# Patient Record
Sex: FEMALE | Race: WHITE | NOT HISPANIC OR LATINO
[De-identification: names, ages, dates, MRNs, and addresses within clinical notes are randomized per-mention and may not be internally consistent; named-entity substitution may affect disease eponyms.]

---

## 2017-12-29 PROBLEM — Z00.00 ENCOUNTER FOR PREVENTIVE HEALTH EXAMINATION: Status: ACTIVE | Noted: 2017-12-29

## 2018-01-01 ENCOUNTER — TRANSCRIPTION ENCOUNTER (OUTPATIENT)
Age: 58
End: 2018-01-01

## 2018-01-26 ENCOUNTER — TRANSCRIPTION ENCOUNTER (OUTPATIENT)
Age: 58
End: 2018-01-26

## 2018-01-30 ENCOUNTER — APPOINTMENT (OUTPATIENT)
Dept: PULMONOLOGY | Facility: CLINIC | Age: 58
End: 2018-01-30
Payer: COMMERCIAL

## 2018-01-30 VITALS
HEART RATE: 70 BPM | DIASTOLIC BLOOD PRESSURE: 80 MMHG | WEIGHT: 112 LBS | HEIGHT: 65 IN | TEMPERATURE: 98.2 F | SYSTOLIC BLOOD PRESSURE: 120 MMHG | BODY MASS INDEX: 18.66 KG/M2 | OXYGEN SATURATION: 97 %

## 2018-01-30 PROCEDURE — 94060 EVALUATION OF WHEEZING: CPT | Mod: 59

## 2018-01-30 PROCEDURE — ZZZZZ: CPT

## 2018-01-30 PROCEDURE — 94727 GAS DIL/WSHOT DETER LNG VOL: CPT

## 2018-01-30 PROCEDURE — 99204 OFFICE O/P NEW MOD 45 MIN: CPT | Mod: 25

## 2018-01-30 PROCEDURE — 94618 PULMONARY STRESS TESTING: CPT

## 2018-01-30 PROCEDURE — 94729 DIFFUSING CAPACITY: CPT

## 2018-02-03 LAB
A1AT SERPL-MCNC: 200 MG/DL
ALBUMIN SERPL ELPH-MCNC: 4.7 G/DL
ALP BLD-CCNC: 72 U/L
ALT SERPL-CCNC: 21 U/L
ANA SER IF-ACNC: NEGATIVE
ANION GAP SERPL CALC-SCNC: 20 MMOL/L
AST SERPL-CCNC: 22 U/L
BACTERIA SPT CULT: ABNORMAL
BASOPHILS NFR BLD AUTO: 1.2 %
BILIRUB SERPL-MCNC: 0.4 MG/DL
BUN SERPL-MCNC: 9 MG/DL
CALCIUM SERPL-MCNC: 10.8 MG/DL
CCP AB SER IA-ACNC: <8 UNITS
CHLORIDE SERPL-SCNC: 105 MMOL/L
CO2 SERPL-SCNC: 22 MMOL/L
CREAT SERPL-MCNC: 0.9 MG/DL
CRP SERPL-MCNC: 0.2 MG/DL
DEPRECATED KAPPA LC FREE/LAMBDA SER: 0.28 RATIO
ENA SS-A AB SER IA-ACNC: <0.2 AL
ENA SS-B AB SER IA-ACNC: <0.2 AL
EOSINOPHIL NFR BLD AUTO: 2.6 %
ERYTHROCYTE [SEDIMENTATION RATE] IN BLOOD BY WESTERGREN METHOD: 26 MM/HR
GLUCOSE SERPL-MCNC: 95 MG/DL
HCT VFR BLD CALC: 41.4 %
HGB BLD-MCNC: 14 G/DL
IGA SER QL IEP: 170 MG/DL
IGG SER QL IEP: 1250 MG/DL
IGM SER QL IEP: 169 MG/DL
KAPPA LC CSF-MCNC: 4.83 MG/DL
KAPPA LC SERPL-MCNC: 1.37 MG/DL
LYMPHOCYTES NFR BLD AUTO: 27.3 %
MAN DIFF?: NO
MCHC RBC-ENTMCNC: 29.8 PG
MCHC RBC-ENTMCNC: 33.8 G/DL
MCV RBC AUTO: 88.1 FL
MONOCYTES NFR BLD AUTO: 6.9 %
NEUTROPHILS NFR BLD AUTO: 62 %
PLATELET # BLD AUTO: 325 K/UL
POTASSIUM SERPL-SCNC: 4.1 MMOL/L
PROT SERPL-MCNC: 7.9 G/DL
RBC # BLD: 4.7 M/UL
RBC # FLD: 12.3 %
RF+CCP IGG SER-IMP: NEGATIVE
RHEUMATOID FACT SER QL: 8 IU/ML
SODIUM SERPL-SCNC: 147 MMOL/L
WBC # FLD AUTO: 5.8 K/UL

## 2018-02-03 RX ORDER — MUCUS CLEARING DEVICE
EACH MISCELLANEOUS
Qty: 1 | Refills: 5 | Status: ACTIVE | COMMUNITY
Start: 2018-01-30 | End: 1900-01-01

## 2018-02-06 RX ORDER — SOFT LENS DISINFECTANT
SOLUTION, NON-ORAL MISCELLANEOUS
Qty: 1 | Refills: 0 | Status: ACTIVE | COMMUNITY
Start: 2018-02-06 | End: 1900-01-01

## 2018-02-18 LAB — CFTR MUT TESTED BLD/T: NORMAL

## 2018-02-22 ENCOUNTER — OTHER (OUTPATIENT)
Age: 58
End: 2018-02-22

## 2018-02-27 ENCOUNTER — RESULT REVIEW (OUTPATIENT)
Age: 58
End: 2018-02-27

## 2018-03-01 ENCOUNTER — APPOINTMENT (OUTPATIENT)
Dept: PULMONOLOGY | Facility: CLINIC | Age: 58
End: 2018-03-01

## 2018-03-02 LAB — BACTERIA SPT CULT: ABNORMAL

## 2018-03-22 ENCOUNTER — RESULT REVIEW (OUTPATIENT)
Age: 58
End: 2018-03-22

## 2018-03-22 RX ORDER — CLARITHROMYCIN 500 MG/1
500 TABLET, FILM COATED ORAL TWICE DAILY
Qty: 1 | Refills: 1 | Status: DISCONTINUED | COMMUNITY
Start: 2018-01-30 | End: 2018-03-22

## 2018-03-22 RX ORDER — ETHAMBUTOL HYDROCHLORIDE 400 MG/1
400 TABLET ORAL DAILY
Qty: 1 | Refills: 1 | Status: DISCONTINUED | COMMUNITY
Start: 2018-01-30 | End: 2018-03-22

## 2018-03-26 LAB — ACID FAST STN SPT: NORMAL

## 2018-03-29 ENCOUNTER — APPOINTMENT (OUTPATIENT)
Dept: PULMONOLOGY | Facility: CLINIC | Age: 58
End: 2018-03-29
Payer: COMMERCIAL

## 2018-03-29 VITALS
OXYGEN SATURATION: 98 % | HEIGHT: 65 IN | WEIGHT: 115 LBS | DIASTOLIC BLOOD PRESSURE: 70 MMHG | SYSTOLIC BLOOD PRESSURE: 110 MMHG | BODY MASS INDEX: 19.16 KG/M2 | TEMPERATURE: 98 F | HEART RATE: 65 BPM

## 2018-03-29 DIAGNOSIS — Z78.9 OTHER SPECIFIED HEALTH STATUS: ICD-10-CM

## 2018-03-29 DIAGNOSIS — Z83.6 FAMILY HISTORY OF OTHER DISEASES OF THE RESPIRATORY SYSTEM: ICD-10-CM

## 2018-03-29 DIAGNOSIS — Z87.39 PERSONAL HISTORY OF OTHER DISEASES OF THE MUSCULOSKELETAL SYSTEM AND CONNECTIVE TISSUE: ICD-10-CM

## 2018-03-29 PROCEDURE — 94010 BREATHING CAPACITY TEST: CPT

## 2018-03-29 PROCEDURE — 99215 OFFICE O/P EST HI 40 MIN: CPT | Mod: 25

## 2018-04-10 ENCOUNTER — RESULT REVIEW (OUTPATIENT)
Age: 58
End: 2018-04-10

## 2018-04-11 ENCOUNTER — RX RENEWAL (OUTPATIENT)
Age: 58
End: 2018-04-11

## 2018-04-17 ENCOUNTER — LABORATORY RESULT (OUTPATIENT)
Age: 58
End: 2018-04-17

## 2018-04-20 ENCOUNTER — RX RENEWAL (OUTPATIENT)
Age: 58
End: 2018-04-20

## 2018-04-24 LAB
ACID FAST STN SPT: NORMAL
BACTERIA SPT CULT: NORMAL

## 2018-04-25 ENCOUNTER — RX RENEWAL (OUTPATIENT)
Age: 58
End: 2018-04-25

## 2018-04-25 RX ORDER — CIPROFLOXACIN HYDROCHLORIDE 500 MG/1
500 TABLET, FILM COATED ORAL
Qty: 28 | Refills: 0 | Status: COMPLETED | COMMUNITY
Start: 2018-03-22 | End: 2018-04-25

## 2018-04-26 ENCOUNTER — RX RENEWAL (OUTPATIENT)
Age: 58
End: 2018-04-26

## 2018-05-15 ENCOUNTER — CLINICAL ADVICE (OUTPATIENT)
Age: 58
End: 2018-05-15

## 2018-05-20 ENCOUNTER — FORM ENCOUNTER (OUTPATIENT)
Age: 58
End: 2018-05-20

## 2018-05-20 LAB — ACID FAST STN SPT: NORMAL

## 2018-05-21 ENCOUNTER — OUTPATIENT (OUTPATIENT)
Dept: OUTPATIENT SERVICES | Facility: HOSPITAL | Age: 58
LOS: 1 days | End: 2018-05-21
Payer: COMMERCIAL

## 2018-05-21 ENCOUNTER — RESULT REVIEW (OUTPATIENT)
Age: 58
End: 2018-05-21

## 2018-05-21 DIAGNOSIS — R09.02 HYPOXEMIA: ICD-10-CM

## 2018-05-21 DIAGNOSIS — R06.02 SHORTNESS OF BREATH: ICD-10-CM

## 2018-05-21 DIAGNOSIS — J18.9 PNEUMONIA, UNSPECIFIED ORGANISM: ICD-10-CM

## 2018-05-21 PROCEDURE — 94729 DIFFUSING CAPACITY: CPT

## 2018-05-21 PROCEDURE — 71046 X-RAY EXAM CHEST 2 VIEWS: CPT

## 2018-05-21 PROCEDURE — 94060 EVALUATION OF WHEEZING: CPT

## 2018-05-21 PROCEDURE — 94618 PULMONARY STRESS TESTING: CPT

## 2018-05-21 PROCEDURE — 71046 X-RAY EXAM CHEST 2 VIEWS: CPT | Mod: 26

## 2018-05-22 PROBLEM — J18.9 PNEUMONIA: Status: ACTIVE | Noted: 2018-05-22

## 2018-05-22 LAB
ALBUMIN SERPL ELPH-MCNC: 4.5 G/DL
ALP BLD-CCNC: 60 U/L
ALT SERPL-CCNC: 16 U/L
ANION GAP SERPL CALC-SCNC: 17 MMOL/L
AST SERPL-CCNC: 20 U/L
BASOPHILS # BLD AUTO: 0.14 K/UL
BASOPHILS NFR BLD AUTO: 1.9 %
BILIRUB SERPL-MCNC: 0.4 MG/DL
BUN SERPL-MCNC: 14 MG/DL
CALCIUM SERPL-MCNC: 9.6 MG/DL
CHLORIDE SERPL-SCNC: 104 MMOL/L
CO2 SERPL-SCNC: 22 MMOL/L
CREAT SERPL-MCNC: 0.93 MG/DL
DEPRECATED D DIMER PPP IA-ACNC: <150 NG/ML DDU
EOSINOPHIL # BLD AUTO: 0.38 K/UL
EOSINOPHIL NFR BLD AUTO: 5.3 %
GLUCOSE SERPL-MCNC: 90 MG/DL
HCT VFR BLD CALC: 41.4 %
HGB BLD-MCNC: 13.8 G/DL
IMM GRANULOCYTES NFR BLD AUTO: 0.1 %
LYMPHOCYTES # BLD AUTO: 1.69 K/UL
LYMPHOCYTES NFR BLD AUTO: 23.5 %
MAN DIFF?: NORMAL
MCHC RBC-ENTMCNC: 30.2 PG
MCHC RBC-ENTMCNC: 33.3 GM/DL
MCV RBC AUTO: 90.6 FL
MONOCYTES # BLD AUTO: 0.56 K/UL
MONOCYTES NFR BLD AUTO: 7.8 %
NEUTROPHILS # BLD AUTO: 4.41 K/UL
NEUTROPHILS NFR BLD AUTO: 61.4 %
PLATELET # BLD AUTO: 309 K/UL
POTASSIUM SERPL-SCNC: 4.1 MMOL/L
PROT SERPL-MCNC: 7.6 G/DL
RBC # BLD: 4.57 M/UL
RBC # FLD: 13 %
SODIUM SERPL-SCNC: 143 MMOL/L
WBC # FLD AUTO: 7.19 K/UL

## 2018-05-24 ENCOUNTER — INPATIENT (INPATIENT)
Facility: HOSPITAL | Age: 58
LOS: 4 days | Discharge: HOME | DRG: 194 | End: 2018-05-29
Attending: INTERNAL MEDICINE | Admitting: INTERNAL MEDICINE
Payer: COMMERCIAL

## 2018-05-24 VITALS
RESPIRATION RATE: 24 BRPM | SYSTOLIC BLOOD PRESSURE: 125 MMHG | TEMPERATURE: 98 F | OXYGEN SATURATION: 96 % | DIASTOLIC BLOOD PRESSURE: 64 MMHG | HEART RATE: 101 BPM

## 2018-05-24 DIAGNOSIS — D41.9 NEOPLASM OF UNCERTAIN BEHAVIOR OF UNSPECIFIED URINARY ORGAN: ICD-10-CM

## 2018-05-24 DIAGNOSIS — A41.9 SEPSIS, UNSPECIFIED ORGANISM: ICD-10-CM

## 2018-05-24 LAB
ALBUMIN SERPL ELPH-MCNC: 3.9 G/DL — SIGNIFICANT CHANGE UP (ref 3.5–5)
ALP SERPL-CCNC: 66 U/L — SIGNIFICANT CHANGE UP (ref 40–120)
ALT FLD-CCNC: 21 U/L DA — SIGNIFICANT CHANGE UP (ref 10–60)
ANION GAP SERPL CALC-SCNC: 5 MMOL/L — SIGNIFICANT CHANGE UP (ref 5–17)
AST SERPL-CCNC: 19 U/L — SIGNIFICANT CHANGE UP (ref 10–40)
BACTERIA SPT CULT: NORMAL
BACTERIA SPT CULT: NORMAL
BASOPHILS # BLD AUTO: 0.2 K/UL — SIGNIFICANT CHANGE UP (ref 0–0.2)
BASOPHILS NFR BLD AUTO: 1.9 % — SIGNIFICANT CHANGE UP (ref 0–2)
BILIRUB SERPL-MCNC: 0.4 MG/DL — SIGNIFICANT CHANGE UP (ref 0.2–1.2)
BUN SERPL-MCNC: 10 MG/DL — SIGNIFICANT CHANGE UP (ref 7–18)
CALCIUM SERPL-MCNC: 9.6 MG/DL — SIGNIFICANT CHANGE UP (ref 8.4–10.5)
CHLORIDE SERPL-SCNC: 106 MMOL/L — SIGNIFICANT CHANGE UP (ref 96–108)
CO2 SERPL-SCNC: 31 MMOL/L — SIGNIFICANT CHANGE UP (ref 22–31)
CREAT SERPL-MCNC: 1.19 MG/DL — SIGNIFICANT CHANGE UP (ref 0.5–1.3)
EOSINOPHIL # BLD AUTO: 0.4 K/UL — SIGNIFICANT CHANGE UP (ref 0–0.5)
EOSINOPHIL NFR BLD AUTO: 5.2 % — SIGNIFICANT CHANGE UP (ref 0–6)
GLUCOSE SERPL-MCNC: 130 MG/DL — HIGH (ref 70–99)
HCT VFR BLD CALC: 44 % — SIGNIFICANT CHANGE UP (ref 34.5–45)
HGB BLD-MCNC: 14.4 G/DL — SIGNIFICANT CHANGE UP (ref 11.5–15.5)
LACTATE SERPL-SCNC: 1.3 MMOL/L — SIGNIFICANT CHANGE UP (ref 0.7–2)
LACTATE SERPL-SCNC: 3.5 MMOL/L — HIGH (ref 0.7–2)
LYMPHOCYTES # BLD AUTO: 1.7 K/UL — SIGNIFICANT CHANGE UP (ref 1–3.3)
LYMPHOCYTES # BLD AUTO: 19.6 % — SIGNIFICANT CHANGE UP (ref 13–44)
MCHC RBC-ENTMCNC: 30.2 PG — SIGNIFICANT CHANGE UP (ref 27–34)
MCHC RBC-ENTMCNC: 32.6 GM/DL — SIGNIFICANT CHANGE UP (ref 32–36)
MCV RBC AUTO: 92.6 FL — SIGNIFICANT CHANGE UP (ref 80–100)
MONOCYTES # BLD AUTO: 0.7 K/UL — SIGNIFICANT CHANGE UP (ref 0–0.9)
MONOCYTES NFR BLD AUTO: 8.5 % — SIGNIFICANT CHANGE UP (ref 2–14)
NEUTROPHILS # BLD AUTO: 5.5 K/UL — SIGNIFICANT CHANGE UP (ref 1.8–7.4)
NEUTROPHILS NFR BLD AUTO: 65 % — SIGNIFICANT CHANGE UP (ref 43–77)
NT-PROBNP SERPL-MCNC: 38 PG/ML
PLATELET # BLD AUTO: 307 K/UL — SIGNIFICANT CHANGE UP (ref 150–400)
POTASSIUM SERPL-MCNC: 3.9 MMOL/L — SIGNIFICANT CHANGE UP (ref 3.5–5.3)
POTASSIUM SERPL-SCNC: 3.9 MMOL/L — SIGNIFICANT CHANGE UP (ref 3.5–5.3)
PROT SERPL-MCNC: 8.1 G/DL — SIGNIFICANT CHANGE UP (ref 6–8.3)
RAPID RVP RESULT: SIGNIFICANT CHANGE UP
RBC # BLD: 4.76 M/UL — SIGNIFICANT CHANGE UP (ref 3.8–5.2)
RBC # FLD: 11.7 % — SIGNIFICANT CHANGE UP (ref 10.3–14.5)
SODIUM SERPL-SCNC: 142 MMOL/L — SIGNIFICANT CHANGE UP (ref 135–145)
WBC # BLD: 8.5 K/UL — SIGNIFICANT CHANGE UP (ref 3.8–10.5)
WBC # FLD AUTO: 8.5 K/UL — SIGNIFICANT CHANGE UP (ref 3.8–10.5)

## 2018-05-24 PROCEDURE — 71045 X-RAY EXAM CHEST 1 VIEW: CPT | Mod: 26

## 2018-05-24 PROCEDURE — 99285 EMERGENCY DEPT VISIT HI MDM: CPT

## 2018-05-24 RX ORDER — SODIUM CHLORIDE 9 MG/ML
1550 INJECTION INTRAMUSCULAR; INTRAVENOUS; SUBCUTANEOUS ONCE
Qty: 0 | Refills: 0 | Status: COMPLETED | OUTPATIENT
Start: 2018-05-24 | End: 2018-05-24

## 2018-05-24 RX ORDER — ALBUTEROL 90 UG/1
2.5 AEROSOL, METERED ORAL ONCE
Qty: 0 | Refills: 0 | Status: COMPLETED | OUTPATIENT
Start: 2018-05-24 | End: 2018-05-24

## 2018-05-24 RX ORDER — PIPERACILLIN AND TAZOBACTAM 4; .5 G/20ML; G/20ML
3.38 INJECTION, POWDER, LYOPHILIZED, FOR SOLUTION INTRAVENOUS ONCE
Qty: 0 | Refills: 0 | Status: COMPLETED | OUTPATIENT
Start: 2018-05-24 | End: 2018-05-24

## 2018-05-24 RX ORDER — PIPERACILLIN AND TAZOBACTAM 4; .5 G/20ML; G/20ML
3.38 INJECTION, POWDER, LYOPHILIZED, FOR SOLUTION INTRAVENOUS ONCE
Qty: 0 | Refills: 0 | Status: DISCONTINUED | OUTPATIENT
Start: 2018-05-24 | End: 2018-05-24

## 2018-05-24 RX ADMIN — PIPERACILLIN AND TAZOBACTAM 200 GRAM(S): 4; .5 INJECTION, POWDER, LYOPHILIZED, FOR SOLUTION INTRAVENOUS at 20:05

## 2018-05-24 RX ADMIN — SODIUM CHLORIDE 2066.67 MILLILITER(S): 9 INJECTION INTRAMUSCULAR; INTRAVENOUS; SUBCUTANEOUS at 20:00

## 2018-05-24 RX ADMIN — ALBUTEROL 2.5 MILLIGRAM(S): 90 AEROSOL, METERED ORAL at 20:35

## 2018-05-24 NOTE — ED PROVIDER NOTE - OBJECTIVE STATEMENT
Patient reports she has had 10 days of chills, cough productive of green mucus, shortness of breath. On 5/21, her pulmonologist diagnosed her with pneumonia and started her on Levaquin. Patient came in today due to worsening SOB. Patient has h/o bronchiectasis and CHALINO.

## 2018-05-24 NOTE — ED PROVIDER NOTE - MEDICAL DECISION MAKING DETAILS
Patient with severe sepsis due to pneumonia that failed outpatient antibiotics. Requires inpatient admission for IV antibiotics.

## 2018-05-24 NOTE — ED PROVIDER NOTE - CARE PLAN
Principal Discharge DX:	Severe sepsis  Secondary Diagnosis:	Pneumonia of right lower lobe due to infectious organism

## 2018-05-25 DIAGNOSIS — J18.9 PNEUMONIA, UNSPECIFIED ORGANISM: ICD-10-CM

## 2018-05-25 DIAGNOSIS — A31.0 PULMONARY MYCOBACTERIAL INFECTION: ICD-10-CM

## 2018-05-25 DIAGNOSIS — Z29.9 ENCOUNTER FOR PROPHYLACTIC MEASURES, UNSPECIFIED: ICD-10-CM

## 2018-05-25 LAB
24R-OH-CALCIDIOL SERPL-MCNC: 52.1 NG/ML — SIGNIFICANT CHANGE UP (ref 30–80)
ANION GAP SERPL CALC-SCNC: 7 MMOL/L — SIGNIFICANT CHANGE UP (ref 5–17)
BASOPHILS # BLD AUTO: 0.1 K/UL — SIGNIFICANT CHANGE UP (ref 0–0.2)
BASOPHILS NFR BLD AUTO: 2.5 % — HIGH (ref 0–2)
BUN SERPL-MCNC: 9 MG/DL — SIGNIFICANT CHANGE UP (ref 7–18)
CALCIUM SERPL-MCNC: 9 MG/DL — SIGNIFICANT CHANGE UP (ref 8.4–10.5)
CHLORIDE SERPL-SCNC: 111 MMOL/L — HIGH (ref 96–108)
CHOLEST SERPL-MCNC: 144 MG/DL — SIGNIFICANT CHANGE UP (ref 10–199)
CK MB BLD-MCNC: 1.6 % — SIGNIFICANT CHANGE UP (ref 0–3.5)
CK MB CFR SERPL CALC: 1.7 NG/ML — SIGNIFICANT CHANGE UP (ref 0–3.6)
CK SERPL-CCNC: 107 U/L — SIGNIFICANT CHANGE UP (ref 21–215)
CO2 SERPL-SCNC: 25 MMOL/L — SIGNIFICANT CHANGE UP (ref 22–31)
CREAT SERPL-MCNC: 0.99 MG/DL — SIGNIFICANT CHANGE UP (ref 0.5–1.3)
EOSINOPHIL # BLD AUTO: 0.5 K/UL — SIGNIFICANT CHANGE UP (ref 0–0.5)
EOSINOPHIL NFR BLD AUTO: 9.2 % — HIGH (ref 0–6)
FOLATE SERPL-MCNC: 18.2 NG/ML — SIGNIFICANT CHANGE UP
GLUCOSE BLDC GLUCOMTR-MCNC: 112 MG/DL — HIGH (ref 70–99)
GLUCOSE BLDC GLUCOMTR-MCNC: 125 MG/DL — HIGH (ref 70–99)
GLUCOSE BLDC GLUCOMTR-MCNC: 214 MG/DL — HIGH (ref 70–99)
GLUCOSE SERPL-MCNC: 88 MG/DL — SIGNIFICANT CHANGE UP (ref 70–99)
GRAM STN FLD: SIGNIFICANT CHANGE UP
HBA1C BLD-MCNC: 5.5 % — SIGNIFICANT CHANGE UP (ref 4–5.6)
HCT VFR BLD CALC: 39.5 % — SIGNIFICANT CHANGE UP (ref 34.5–45)
HDLC SERPL-MCNC: 60 MG/DL — SIGNIFICANT CHANGE UP (ref 40–125)
HGB BLD-MCNC: 12.8 G/DL — SIGNIFICANT CHANGE UP (ref 11.5–15.5)
LIPID PNL WITH DIRECT LDL SERPL: 72 MG/DL — SIGNIFICANT CHANGE UP
LYMPHOCYTES # BLD AUTO: 1.3 K/UL — SIGNIFICANT CHANGE UP (ref 1–3.3)
LYMPHOCYTES # BLD AUTO: 24.5 % — SIGNIFICANT CHANGE UP (ref 13–44)
MAGNESIUM SERPL-MCNC: 2.2 MG/DL — SIGNIFICANT CHANGE UP (ref 1.6–2.6)
MCHC RBC-ENTMCNC: 30.4 PG — SIGNIFICANT CHANGE UP (ref 27–34)
MCHC RBC-ENTMCNC: 32.5 GM/DL — SIGNIFICANT CHANGE UP (ref 32–36)
MCV RBC AUTO: 93.6 FL — SIGNIFICANT CHANGE UP (ref 80–100)
MONOCYTES # BLD AUTO: 0.6 K/UL — SIGNIFICANT CHANGE UP (ref 0–0.9)
MONOCYTES NFR BLD AUTO: 10 % — SIGNIFICANT CHANGE UP (ref 2–14)
NEUTROPHILS # BLD AUTO: 2.9 K/UL — SIGNIFICANT CHANGE UP (ref 1.8–7.4)
NEUTROPHILS NFR BLD AUTO: 53.8 % — SIGNIFICANT CHANGE UP (ref 43–77)
PHOSPHATE SERPL-MCNC: 3.5 MG/DL — SIGNIFICANT CHANGE UP (ref 2.5–4.5)
PLATELET # BLD AUTO: 241 K/UL — SIGNIFICANT CHANGE UP (ref 150–400)
POTASSIUM SERPL-MCNC: 4.4 MMOL/L — SIGNIFICANT CHANGE UP (ref 3.5–5.3)
POTASSIUM SERPL-SCNC: 4.4 MMOL/L — SIGNIFICANT CHANGE UP (ref 3.5–5.3)
PROCALCITONIN SERPL-MCNC: 0.03 NG/ML — SIGNIFICANT CHANGE UP (ref 0.02–0.1)
RBC # BLD: 4.22 M/UL — SIGNIFICANT CHANGE UP (ref 3.8–5.2)
RBC # FLD: 12 % — SIGNIFICANT CHANGE UP (ref 10.3–14.5)
SODIUM SERPL-SCNC: 143 MMOL/L — SIGNIFICANT CHANGE UP (ref 135–145)
SPECIMEN SOURCE: SIGNIFICANT CHANGE UP
TOTAL CHOLESTEROL/HDL RATIO MEASUREMENT: 2.4 RATIO — LOW (ref 3.3–7.1)
TRIGL SERPL-MCNC: 60 MG/DL — SIGNIFICANT CHANGE UP (ref 10–149)
TROPONIN I SERPL-MCNC: <0.015 NG/ML — SIGNIFICANT CHANGE UP (ref 0–0.04)
TSH SERPL-MCNC: 5.28 UU/ML — HIGH (ref 0.34–4.82)
VIT B12 SERPL-MCNC: 935 PG/ML — SIGNIFICANT CHANGE UP (ref 232–1245)
WBC # BLD: 5.5 K/UL — SIGNIFICANT CHANGE UP (ref 3.8–10.5)
WBC # FLD AUTO: 5.5 K/UL — SIGNIFICANT CHANGE UP (ref 3.8–10.5)

## 2018-05-25 RX ORDER — TOBRAMYCIN SULFATE 40 MG/ML
300 VIAL (ML) INJECTION
Qty: 0 | Refills: 0 | Status: DISCONTINUED | OUTPATIENT
Start: 2018-05-25 | End: 2018-05-29

## 2018-05-25 RX ORDER — TOBRAMYCIN SULFATE 40 MG/ML
0 VIAL (ML) INJECTION
Qty: 280 | Refills: 0 | COMMUNITY

## 2018-05-25 RX ORDER — IPRATROPIUM/ALBUTEROL SULFATE 18-103MCG
3 AEROSOL WITH ADAPTER (GRAM) INHALATION EVERY 6 HOURS
Qty: 0 | Refills: 0 | Status: DISCONTINUED | OUTPATIENT
Start: 2018-05-25 | End: 2018-05-25

## 2018-05-25 RX ORDER — ACETAMINOPHEN 500 MG
650 TABLET ORAL ONCE
Qty: 0 | Refills: 0 | Status: COMPLETED | OUTPATIENT
Start: 2018-05-25 | End: 2018-05-25

## 2018-05-25 RX ORDER — CEFEPIME 1 G/1
INJECTION, POWDER, FOR SOLUTION INTRAMUSCULAR; INTRAVENOUS
Qty: 0 | Refills: 0 | Status: DISCONTINUED | OUTPATIENT
Start: 2018-05-25 | End: 2018-05-25

## 2018-05-25 RX ORDER — INSULIN LISPRO 100/ML
VIAL (ML) SUBCUTANEOUS
Qty: 0 | Refills: 0 | Status: DISCONTINUED | OUTPATIENT
Start: 2018-05-25 | End: 2018-05-29

## 2018-05-25 RX ORDER — CEFEPIME 1 G/1
1000 INJECTION, POWDER, FOR SOLUTION INTRAMUSCULAR; INTRAVENOUS ONCE
Qty: 0 | Refills: 0 | Status: COMPLETED | OUTPATIENT
Start: 2018-05-25 | End: 2018-05-25

## 2018-05-25 RX ORDER — IPRATROPIUM/ALBUTEROL SULFATE 18-103MCG
3 AEROSOL WITH ADAPTER (GRAM) INHALATION EVERY 6 HOURS
Qty: 0 | Refills: 0 | Status: DISCONTINUED | OUTPATIENT
Start: 2018-05-25 | End: 2018-05-29

## 2018-05-25 RX ORDER — CEFEPIME 1 G/1
1000 INJECTION, POWDER, FOR SOLUTION INTRAMUSCULAR; INTRAVENOUS EVERY 12 HOURS
Qty: 0 | Refills: 0 | Status: DISCONTINUED | OUTPATIENT
Start: 2018-05-25 | End: 2018-05-29

## 2018-05-25 RX ORDER — CEFEPIME 1 G/1
1000 INJECTION, POWDER, FOR SOLUTION INTRAMUSCULAR; INTRAVENOUS DAILY
Qty: 0 | Refills: 0 | Status: DISCONTINUED | OUTPATIENT
Start: 2018-05-25 | End: 2018-05-25

## 2018-05-25 RX ADMIN — CEFEPIME 100 MILLIGRAM(S): 1 INJECTION, POWDER, FOR SOLUTION INTRAMUSCULAR; INTRAVENOUS at 18:14

## 2018-05-25 RX ADMIN — CEFEPIME 100 MILLIGRAM(S): 1 INJECTION, POWDER, FOR SOLUTION INTRAMUSCULAR; INTRAVENOUS at 13:02

## 2018-05-25 RX ADMIN — Medication 3 MILLILITER(S): at 15:11

## 2018-05-25 RX ADMIN — Medication 3 MILLILITER(S): at 08:52

## 2018-05-25 RX ADMIN — Medication 300 MILLIGRAM(S): at 09:33

## 2018-05-25 RX ADMIN — Medication 650 MILLIGRAM(S): at 18:14

## 2018-05-25 RX ADMIN — Medication 300 MILLIGRAM(S): at 21:17

## 2018-05-25 RX ADMIN — Medication 40 MILLIGRAM(S): at 18:14

## 2018-05-25 RX ADMIN — Medication 40 MILLIGRAM(S): at 13:02

## 2018-05-25 RX ADMIN — Medication 40 MILLIGRAM(S): at 23:00

## 2018-05-25 RX ADMIN — Medication 650 MILLIGRAM(S): at 18:42

## 2018-05-25 NOTE — H&P ADULT - RS GEN PE MLT RESP DETAILS PC
clear to auscultation bilaterally/airway patent/diminished breath sounds, R/respirations non-labored

## 2018-05-25 NOTE — H&P ADULT - ASSESSMENT
58 years old female from home with PMH of Bronchiectasis and CHALINO presented to ED with c/o worsening sob and productive cough over past week

## 2018-05-25 NOTE — H&P ADULT - PROBLEM SELECTOR PLAN 3
IMPROVE VTE Individual Risk Assessment          RISK                                                          Points  [  ] Previous VTE                                                3  [  ] Thrombophilia                                             2  [  ] Lower limb paralysis                                   2        (unable to hold up >15 seconds)    [  ] Current Cancer                                             2         (within 6 months)  [ x ] Immobilization > 24 hrs                              1  [  ] ICU/CCU stay > 24 hours                             1  [  ] Age > 60                                                         1    IMPROVE VTE Score: 1  No indication for DVT ppx.

## 2018-05-25 NOTE — H&P ADULT - HISTORY OF PRESENT ILLNESS
58 years old female from home with PMH of Bronchiectasis and CHALINO (Mycobacterium Avium Intracellulare) presented to ED with c/o worsening sob and productive cough over past week. Patient started to have exertional sob associated with greenish productive cough , malaise, hoarseness of voice and sweating for past 2 weeks. Was seen by Pulmonologist on last Friday, had pulmonary function tests done and was told her function has decreased from before, also had CXR which showed Pneumonia, so was started on Levaquin which she took for past 3 days. Patient had progressive worsening of sob over few days despite taking antibiotics prompting the ED visit. Denied any fever, sore throat, nasal congestion, headache, dizziness, chest pain, palpitations, abdominal pain, nausea, vomiting, diarrhea or urinary complains.   Patient has been diagnosed with CHALINO in 2015, was treated with triple drug therapy for 13 months and was stopped as had adverse reaction to Rifampin in terms of confusion. Currently follows with Dr. Guillen , was started on saline nebs and Tobramycin nebs for total 3 months (completed 2 months so far) as was found to have Pseudomonas infection. Patient also stated that she is suppose to start for CHALINO treatment soon once she finish the current treatment for Pseudomonas.

## 2018-05-25 NOTE — H&P ADULT - PROBLEM SELECTOR PLAN 1
-58 F with pmh Bronchiectasis and CHALINO presented to ED with c/o worsening sob and productive cough over past week, after failing outpatient therapy for PNA  -CXR: Asymmetric increased airspace opacity projecting within the medial right   lower lung field may represent developing pneumonia.  -On admission was tachycardic and tachypneic with elevated Lactate to 3.5  -s/p IV Zosyn and IV Levaquin and 1.5 L NS bolus  -Lactate normalized to 1.3  -c/w IV abx  -f/u Blood cultures

## 2018-05-25 NOTE — H&P ADULT - NEGATIVE CARDIOVASCULAR SYMPTOMS
Dr. Segovia notified of patient's symptoms and in to see patient.    no paroxysmal nocturnal dyspnea/no palpitations/no peripheral edema/no chest pain

## 2018-05-25 NOTE — ED ADULT NURSE REASSESSMENT NOTE - NS ED NURSE REASSESS COMMENT FT1
S/P sepsis, 2nd lactate 1.3. Pt is administered all meds as ordered, denies any discomfort at time of reassessment. Admitted to [facility], report given to OMAR Melgar for [address], [address], awaiting transport.

## 2018-05-25 NOTE — CONSULT NOTE ADULT - ASSESSMENT
EXACERBATION BRONCHIECTASIS-? RT SIDED PN  CHALINO  HYPOTHYROID    PLAN; MAINTAIN CEFIPIME/DAGOBERTO            SPUTUM CULTURE           ADD STEROIDS           NEBS/02    THANK YOU EXACERBATION BRONCHIECTASIS-? RT SIDED PN  CHALINO  HYPOTHYROID    PLAN; MAINTAIN CEFIPIME/DAGOBERTO            SPUTUM CULTURE           PROCALCITONIN           ADD STEROIDS           NEBS/02    THANK YOU

## 2018-05-25 NOTE — CONSULT NOTE ADULT - SUBJECTIVE AND OBJECTIVE BOX
PULMONARY CONSULTATION NOTE      ZOE GAN  MRN-[id number]    Patient is a 58y old  Female who presents with a chief complaint of worsening sob and productive cough (25 May 2018 05:41)      HISTORY OF PRESENT ILLNESS:  57 Y/O NON SMOKER WITH HX. OF BRONCHIECTASIS SINCE 2015-ON TX FOR 13 MO.,STOPPED WITH SIDE EFFECTS FROM RIFAMPIN ANTIRED OF TX.  RECENTLY GREW PSEUDOMONAS FOR WHICH SHE HAS BEEN ON DAGOBERTO. NOW PRESENTS WITH 10D HX COUGH/SOB.ADMITTED WITH DX PNEUMONIA.PRIOR HX PNEUMONIA PRECEEDING HER DX OF BRONCHIECTASIS.    MEDICATIONS  (STANDING):  ALBUTerol/ipratropium for Nebulization 3 milliLiter(s) Nebulizer every 6 hours  cefepime   IVPB 1000 milliGRAM(s) IV Intermittent daily  tobramycin for Nebulization 300 milliGRAM(s) Inhalation two times a day    MEDICATIONS  (PRN):      Allergies    rifampin (Other)          PAST MEDICAL & SURGICAL HISTORY:  Bronchiectasis  CHALINO (mycobacterium avium-intracellulare)  No significant past surgical history      FAMILY HISTORY:  Family history of bronchiectasis (Sibling)      SOCIAL HISTORY  Smoking History: NONE    REVIEW OF SYSTEMS:    CONSTITUTIONAL:  No fevers, chills, sweats    HEENT:  Eyes:  No diplopia or blurred vision. ENT:  No earache, sore throat or runny nose.    CARDIOVASCULAR:  No pressure, squeezing, tightness, or heaviness about the chest; no palpitations.    RESPIRATORY:  Per HPI    GASTROINTESTINAL:  No abdominal pain, nausea, vomiting or diarrhea.    GENITOURINARY:  No dysuria, frequency or urgency.    NEUROLOGIC:  No paresthesias, fasciculations, seizures or weakness.    PSYCHIATRIC:  No disorder of thought or mood.    Vital Signs Last 24 Hrs  T(C): 36.4 (25 May 2018 05:26), Max: 36.9 (24 May 2018 19:13)  T(F): 97.6 (25 May 2018 05:26), Max: 98.5 (24 May 2018 19:13)  HR: 67 (25 May 2018 05:26) (67 - 101)  BP: 119/66 (25 May 2018 05:26) (119/66 - 143/76)  BP(mean): --  RR: 16 (25 May 2018 05:26) (16 - 24)  SpO2: 96% (25 May 2018 05:26) (95% - 97%)  I&O's Detail      PHYSICAL EXAMINATION:    GENERAL: The patient is a well-developed, well-nourished FEMALE in no apparent distress.     HEENT: Head is normocephalic and atraumatic. Extraocular muscles are intact. Mucous membranes are moist.     NECK: Supple.     LUNGS: Clear to auscultation without wheezing, rales, or rhonchi. Respirations unlabored    HEART: Regular rate and rhythm without murmur.    ABDOMEN: Soft, nontender, and nondistended.  No hepatosplenomegaly is noted.    EXTREMITIES: Without any cyanosis, clubbing, rash, lesions or edema.    NEUROLOGIC: Grossly intact.      LABS:                        12.8   5.5   )-----------( 241      ( 25 May 2018 07:17 )             39.5     05-25    143  |  111<H>  |  9   ----------------------------<  88  4.4   |  25  |  0.99    Ca    9.0      25 May 2018 07:17  Phos  3.5     05-25  Mg     2.2     05-25    TPro  8.1  /  Alb  3.9  /  TBili  0.4  /  DBili  x   /  AST  19  /  ALT  21  /  AlkPhos  66  05-24                  Lactate, Blood: 1.3 mmol/L (05-24-18 @ 21:21)  Lactate, Blood: 3.5 mmol/L (05-24-18 @ 19:41)        MICROBIOLOGY:    RADIOLOGY & ADDITIONAL STUDIES:    CXR:  EXAM:  XR CHEST AP OR PA 1V                            PROCEDURE DATE:  05/24/2018          INTERPRETATION:  CLINICAL INDICATION: Productive cough and dyspnea x 10   days.    COMPARISON: There are no similar prior studies available for comparison.     FINDINGS:    Asymmetric increased airspace opacity projecting within the medial right   lower lung field may represent developing pneumonia. No sizable pleural   effusion.   There is no evidence of pneumothorax.  The heart size is normal.     IMPRESSION:    Asymmetric increased airspace opacity projecting within the medial right   lower lung field may represent developing pneumonia. PULMONARY CONSULTATION NOTE      ZOE GAN  MRN-[id number]    Patient is a 58y old  Female who presents with a chief complaint of worsening sob and productive cough (25 May 2018 05:41)      HISTORY OF PRESENT ILLNESS:  57 Y/O NON SMOKER WITH HX. OF BRONCHIECTASIS SINCE 2015-ON TX FOR 13 MO.,STOPPED WITH SIDE EFFECTS FROM RIFAMPIN ANTIRED OF TX.  RECENTLY GREW PSEUDOMONAS FOR WHICH SHE HAS BEEN ON DAGOBERTO. NOW PRESENTS WITH 10D HX COUGH/SOB.ADMITTED WITH DX PNEUMONIA.PRIOR HX PNEUMONIA PRECEEDING HER DX OF BRONCHIECTASIS.    MEDICATIONS  (STANDING):  ALBUTerol/ipratropium for Nebulization 3 milliLiter(s) Nebulizer every 6 hours  cefepime   IVPB 1000 milliGRAM(s) IV Intermittent daily  tobramycin for Nebulization 300 milliGRAM(s) Inhalation two times a day    MEDICATIONS  (PRN):      Allergies    rifampin (Other)          PAST MEDICAL & SURGICAL HISTORY:  Bronchiectasis  CHALINO (mycobacterium avium-intracellulare)  No significant past surgical history      FAMILY HISTORY:  Family history of bronchiectasis (Sibling)      SOCIAL HISTORY  Smoking History: NONE    REVIEW OF SYSTEMS:    CONSTITUTIONAL:  No fevers, chills, sweats    HEENT:  Eyes:  No diplopia or blurred vision. ENT:  No earache, sore throat or runny nose.    CARDIOVASCULAR:  No pressure, squeezing, tightness, or heaviness about the chest; no palpitations.    RESPIRATORY:  Per HPI    GASTROINTESTINAL:  No abdominal pain, nausea, vomiting or diarrhea.    GENITOURINARY:  No dysuria, frequency or urgency.    NEUROLOGIC:  No paresthesias, fasciculations, seizures or weakness.    PSYCHIATRIC:  No disorder of thought or mood.    Vital Signs Last 24 Hrs  T(C): 36.4 (25 May 2018 05:26), Max: 36.9 (24 May 2018 19:13)  T(F): 97.6 (25 May 2018 05:26), Max: 98.5 (24 May 2018 19:13)  HR: 67 (25 May 2018 05:26) (67 - 101)  BP: 119/66 (25 May 2018 05:26) (119/66 - 143/76)  BP(mean): --  RR: 16 (25 May 2018 05:26) (16 - 24)  SpO2: 96% (25 May 2018 05:26) (95% - 97%)  I&O's Detail      PHYSICAL EXAMINATION:    GENERAL: The patient is a well-developed, well-nourished FEMALE in no apparent distress.     HEENT: Head is normocephalic and atraumatic. Extraocular muscles are intact. Mucous membranes are moist.     NECK: Supple.     LUNGS: wheezing mary,scattered creps    HEART: Regular rate and rhythm without murmur.    ABDOMEN: Soft, nontender, and nondistended.  No hepatosplenomegaly is noted.    EXTREMITIES: Without any cyanosis, clubbing, rash, lesions or edema.    NEUROLOGIC: Grossly intact.      LABS:                        12.8   5.5   )-----------( 241      ( 25 May 2018 07:17 )             39.5     05-25    143  |  111<H>  |  9   ----------------------------<  88  4.4   |  25  |  0.99    Ca    9.0      25 May 2018 07:17  Phos  3.5     05-25  Mg     2.2     05-25    TPro  8.1  /  Alb  3.9  /  TBili  0.4  /  DBili  x   /  AST  19  /  ALT  21  /  AlkPhos  66  05-24                  Lactate, Blood: 1.3 mmol/L (05-24-18 @ 21:21)  Lactate, Blood: 3.5 mmol/L (05-24-18 @ 19:41)        MICROBIOLOGY:    RADIOLOGY & ADDITIONAL STUDIES:    CXR:  EXAM:  XR CHEST AP OR PA 1V                            PROCEDURE DATE:  05/24/2018          INTERPRETATION:  CLINICAL INDICATION: Productive cough and dyspnea x 10   days.    COMPARISON: There are no similar prior studies available for comparison.     FINDINGS:    Asymmetric increased airspace opacity projecting within the medial right   lower lung field may represent developing pneumonia. No sizable pleural   effusion.   There is no evidence of pneumothorax.  The heart size is normal.     IMPRESSION:    Asymmetric increased airspace opacity projecting within the medial right   lower lung field may represent developing pneumonia.

## 2018-05-26 LAB
ANION GAP SERPL CALC-SCNC: 11 MMOL/L — SIGNIFICANT CHANGE UP (ref 5–17)
BASOPHILS # BLD AUTO: 0 K/UL — SIGNIFICANT CHANGE UP (ref 0–0.2)
BASOPHILS NFR BLD AUTO: 0.3 % — SIGNIFICANT CHANGE UP (ref 0–2)
BUN SERPL-MCNC: 13 MG/DL — SIGNIFICANT CHANGE UP (ref 7–18)
CALCIUM SERPL-MCNC: 9.7 MG/DL — SIGNIFICANT CHANGE UP (ref 8.4–10.5)
CHLORIDE SERPL-SCNC: 108 MMOL/L — SIGNIFICANT CHANGE UP (ref 96–108)
CO2 SERPL-SCNC: 20 MMOL/L — LOW (ref 22–31)
CREAT SERPL-MCNC: 0.96 MG/DL — SIGNIFICANT CHANGE UP (ref 0.5–1.3)
EOSINOPHIL # BLD AUTO: 0 K/UL — SIGNIFICANT CHANGE UP (ref 0–0.5)
EOSINOPHIL NFR BLD AUTO: 0 % — SIGNIFICANT CHANGE UP (ref 0–6)
GLUCOSE BLDC GLUCOMTR-MCNC: 126 MG/DL — HIGH (ref 70–99)
GLUCOSE BLDC GLUCOMTR-MCNC: 133 MG/DL — HIGH (ref 70–99)
GLUCOSE BLDC GLUCOMTR-MCNC: 143 MG/DL — HIGH (ref 70–99)
GLUCOSE BLDC GLUCOMTR-MCNC: 154 MG/DL — HIGH (ref 70–99)
GLUCOSE SERPL-MCNC: 131 MG/DL — HIGH (ref 70–99)
HCT VFR BLD CALC: 44.5 % — SIGNIFICANT CHANGE UP (ref 34.5–45)
HGB BLD-MCNC: 14.5 G/DL — SIGNIFICANT CHANGE UP (ref 11.5–15.5)
LEGIONELLA AG UR QL: NEGATIVE — SIGNIFICANT CHANGE UP
LYMPHOCYTES # BLD AUTO: 1 K/UL — SIGNIFICANT CHANGE UP (ref 1–3.3)
LYMPHOCYTES # BLD AUTO: 10.8 % — LOW (ref 13–44)
MCHC RBC-ENTMCNC: 30.3 PG — SIGNIFICANT CHANGE UP (ref 27–34)
MCHC RBC-ENTMCNC: 32.6 GM/DL — SIGNIFICANT CHANGE UP (ref 32–36)
MCV RBC AUTO: 93 FL — SIGNIFICANT CHANGE UP (ref 80–100)
MONOCYTES # BLD AUTO: 0.2 K/UL — SIGNIFICANT CHANGE UP (ref 0–0.9)
MONOCYTES NFR BLD AUTO: 1.9 % — LOW (ref 2–14)
NEUTROPHILS # BLD AUTO: 8.4 K/UL — HIGH (ref 1.8–7.4)
NEUTROPHILS NFR BLD AUTO: 87 % — HIGH (ref 43–77)
PLATELET # BLD AUTO: 319 K/UL — SIGNIFICANT CHANGE UP (ref 150–400)
POTASSIUM SERPL-MCNC: 4.3 MMOL/L — SIGNIFICANT CHANGE UP (ref 3.5–5.3)
POTASSIUM SERPL-SCNC: 4.3 MMOL/L — SIGNIFICANT CHANGE UP (ref 3.5–5.3)
RBC # BLD: 4.78 M/UL — SIGNIFICANT CHANGE UP (ref 3.8–5.2)
RBC # FLD: 11.8 % — SIGNIFICANT CHANGE UP (ref 10.3–14.5)
SODIUM SERPL-SCNC: 139 MMOL/L — SIGNIFICANT CHANGE UP (ref 135–145)
WBC # BLD: 9.6 K/UL — SIGNIFICANT CHANGE UP (ref 3.8–10.5)
WBC # FLD AUTO: 9.6 K/UL — SIGNIFICANT CHANGE UP (ref 3.8–10.5)

## 2018-05-26 PROCEDURE — 71250 CT THORAX DX C-: CPT | Mod: 26

## 2018-05-26 RX ADMIN — Medication 300 MILLIGRAM(S): at 21:25

## 2018-05-26 RX ADMIN — Medication 40 MILLIGRAM(S): at 14:40

## 2018-05-26 RX ADMIN — CEFEPIME 100 MILLIGRAM(S): 1 INJECTION, POWDER, FOR SOLUTION INTRAMUSCULAR; INTRAVENOUS at 17:34

## 2018-05-26 RX ADMIN — Medication 300 MILLIGRAM(S): at 09:56

## 2018-05-26 RX ADMIN — Medication 40 MILLIGRAM(S): at 17:34

## 2018-05-26 RX ADMIN — Medication 40 MILLIGRAM(S): at 05:27

## 2018-05-26 RX ADMIN — CEFEPIME 100 MILLIGRAM(S): 1 INJECTION, POWDER, FOR SOLUTION INTRAMUSCULAR; INTRAVENOUS at 05:26

## 2018-05-26 NOTE — PROGRESS NOTE ADULT - SUBJECTIVE AND OBJECTIVE BOX
Medical attending follow up:    Patient is a 58y old  Female who presents with a chief complaint of worsening sob and productive cough (25 May 2018 05:41)      INTERVAL HPI/OVERNIGHT EVENTS:  T(C): 36.3 (05-26-18 @ 06:00), Max: 37.1 (05-25-18 @ 20:17)  HR: 84 (05-26-18 @ 06:00) (64 - 116)  BP: 111/55 (05-26-18 @ 06:00) (111/47 - 130/78)  RR: 15 (05-26-18 @ 06:00) (15 - 18)  SpO2: 95% (05-26-18 @ 06:00) (93% - 97%)  Wt(kg): --  I&O's Summary      LABS:                        14.5   9.6   )-----------( 319      ( 26 May 2018 07:05 )             44.5     05-26    139  |  108  |  13  ----------------------------<  131<H>  4.3   |  20<L>  |  0.96    Ca    9.7      26 May 2018 07:05  Phos  3.5     05-25  Mg     2.2     05-25    TPro  8.1  /  Alb  3.9  /  TBili  0.4  /  DBili  x   /  AST  19  /  ALT  21  /  AlkPhos  66  05-24        CAPILLARY BLOOD GLUCOSE      POCT Blood Glucose.: 126 mg/dL (26 May 2018 07:50)  POCT Blood Glucose.: 214 mg/dL (25 May 2018 21:28)  POCT Blood Glucose.: 112 mg/dL (25 May 2018 16:19)  POCT Blood Glucose.: 125 mg/dL (25 May 2018 12:45)          REVIEW OF SYSTEMS:  CONSTITUTIONAL: No fever, weight loss, or fatigue  EYES: No eye pain, visual disturbances, or discharge  ENMT:  No difficulty hearing, tinnitus, vertigo; No sinus or throat pain  NECK: No pain or stiffness  BREASTS: No pain, masses, or nipple discharge  RESPIRATORY: cough, dyspnea present.  CARDIOVASCULAR: No chest pain, palpitations, dizziness, or leg swelling  GASTROINTESTINAL: No abdominal or epigastric pain. No nausea, vomiting, or hematemesis; No diarrhea or constipation. No melena or hematochezia.  GENITOURINARY: No dysuria, frequency, hematuria, or incontinence  NEUROLOGICAL: No headaches, memory loss, loss of strength, numbness, or tremors  SKIN: No itching, burning, rashes, or lesions   LYMPH NODES: No enlarged glands  ENDOCRINE: No heat or cold intolerance; No hair loss  MUSCULOSKELETAL: No joint pain or swelling; No muscle, back, or extremity pain  PSYCHIATRIC: No depression, anxiety, mood swings, or difficulty sleeping  HEME/LYMPH: No easy bruising, or bleeding gums  ALLERY AND IMMUNOLOGIC: No hives or eczema    RADIOLOGY & ADDITIONAL TESTS:    Imaging Personally Reviewed:  [ ] YES  [ ] NO    Consultant(s) Notes Reviewed:  [ ] YES  [ ] NO    PHYSICAL EXAM:  GENERAL: NAD, well-groomed, well-developed  HEAD:  Atraumatic, Normocephalic  EYES: EOMI, PERRLA, conjunctiva and sclera clear  ENMT: No tonsillar erythema, exudates, or enlargement; Moist mucous membranes, Good dentition, No lesions  NECK: Supple, No JVD, Normal thyroid  NERVOUS SYSTEM:  Alert & Oriented X3, Good concentration; Motor Strength 5/5 B/L upper and lower extremities; DTRs 2+ intact and symmetric  CHEST/LUNG: b/l diffuse crepitations.  HEART: Regular rate and rhythm; No murmurs, rubs, or gallops  ABDOMEN: Soft, Nontender, Nondistended; Bowel sounds present  EXTREMITIES:  2+ Peripheral Pulses, No clubbing, cyanosis, or edema  LYMPH: No lymphadenopathy noted  SKIN: No rashes or lesions    Care Discussed with Consultants/Other Providers [ ] YES  [ ] NO    ALBUTerol/ipratropium for Nebulization 3 milliLiter(s) Nebulizer every 6 hours PRN Shortness of Breath and/or Wheezing  cefepime   IVPB 1000 milliGRAM(s) IV Intermittent every 12 hours  insulin lispro (HumaLOG) corrective regimen sliding scale   SubCutaneous three times a day before meals  methylPREDNISolone sodium succinate Injectable 40 milliGRAM(s) IV Push every 6 hours  tobramycin for Nebulization 300 milliGRAM(s) Inhalation two times a day      A/P Pneumonia, Bronchiactesis, CHALINO by history,     Pulmonary consult noted, continue present treatment as still dyspneic.

## 2018-05-27 LAB
CULTURE RESULTS: SIGNIFICANT CHANGE UP
GLUCOSE BLDC GLUCOMTR-MCNC: 117 MG/DL — HIGH (ref 70–99)
GLUCOSE BLDC GLUCOMTR-MCNC: 130 MG/DL — HIGH (ref 70–99)
GLUCOSE BLDC GLUCOMTR-MCNC: 135 MG/DL — HIGH (ref 70–99)
GLUCOSE BLDC GLUCOMTR-MCNC: 149 MG/DL — HIGH (ref 70–99)
SPECIMEN SOURCE: SIGNIFICANT CHANGE UP

## 2018-05-27 RX ADMIN — Medication 300 MILLIGRAM(S): at 21:00

## 2018-05-27 RX ADMIN — Medication 300 MILLIGRAM(S): at 09:56

## 2018-05-27 RX ADMIN — Medication 40 MILLIGRAM(S): at 13:24

## 2018-05-27 RX ADMIN — Medication 40 MILLIGRAM(S): at 05:39

## 2018-05-27 RX ADMIN — Medication 40 MILLIGRAM(S): at 17:09

## 2018-05-27 RX ADMIN — CEFEPIME 100 MILLIGRAM(S): 1 INJECTION, POWDER, FOR SOLUTION INTRAMUSCULAR; INTRAVENOUS at 17:10

## 2018-05-27 RX ADMIN — CEFEPIME 100 MILLIGRAM(S): 1 INJECTION, POWDER, FOR SOLUTION INTRAMUSCULAR; INTRAVENOUS at 05:39

## 2018-05-27 RX ADMIN — Medication 40 MILLIGRAM(S): at 00:22

## 2018-05-27 NOTE — PROGRESS NOTE ADULT - SUBJECTIVE AND OBJECTIVE BOX
Medical attending follow up:    Patient is a 58y old  Female who presents with a chief complaint of worsening sob and productive cough (25 May 2018 05:41)      INTERVAL HPI/OVERNIGHT EVENTS: none  Vital Signs Last 24 Hrs  T(C): 36.5 (27 May 2018 05:02), Max: 36.7 (26 May 2018 19:53)  T(F): 97.7 (27 May 2018 05:02), Max: 98 (26 May 2018 19:53)  HR: 74 (27 May 2018 05:02) (68 - 87)  BP: 117/64 (27 May 2018 05:02) (112/70 - 117/64)  BP(mean): --  RR: 16 (27 May 2018 05:02) (16 - 18)  SpO2: 96% (27 May 2018 05:02) (94% - 96%)      LABS:                                   14.5   9.6   )-----------( 319      ( 26 May 2018 07:05 )             44.5   05-26    139  |  108  |  13  ----------------------------<  131<H>  4.3   |  20<L>  |  0.96    Ca    9.7      26 May 2018 07:05            CAPILLARY BLOOD GLUCOSE      POCT Blood Glucose.: 126 mg/dL (26 May 2018 07:50)  POCT Blood Glucose.: 214 mg/dL (25 May 2018 21:28)  POCT Blood Glucose.: 112 mg/dL (25 May 2018 16:19)  POCT Blood Glucose.: 125 mg/dL (25 May 2018 12:45)          REVIEW OF SYSTEMS:  CONSTITUTIONAL: No fever, weight loss, or fatigue  EYES: No eye pain, visual disturbances, or discharge  ENMT:  No difficulty hearing, tinnitus, vertigo; No sinus or throat pain  NECK: No pain or stiffness  BREASTS: No pain, masses, or nipple discharge  RESPIRATORY: cough, dyspnea present on minimal exertion.  CARDIOVASCULAR: No chest pain, palpitations, dizziness, or leg swelling  GASTROINTESTINAL: No abdominal or epigastric pain. No nausea, vomiting, or hematemesis; No diarrhea or constipation. No melena or hematochezia.  GENITOURINARY: No dysuria, frequency, hematuria, or incontinence  NEUROLOGICAL: No headaches, memory loss, loss of strength, numbness, or tremors  SKIN: No itching, burning, rashes, or lesions   LYMPH NODES: No enlarged glands  ENDOCRINE: No heat or cold intolerance; No hair loss  MUSCULOSKELETAL: No joint pain or swelling; No muscle, back, or extremity pain  PSYCHIATRIC: No depression, anxiety, mood swings, or difficulty sleeping  HEME/LYMPH: No easy bruising, or bleeding gums  ALLERY AND IMMUNOLOGIC: No hives or eczema    RADIOLOGY & ADDITIONAL TESTS:    Imaging Personally Reviewed:  [ ] YES  [ ] NO    Consultant(s) Notes Reviewed:  [ ] YES  [ ] NO    PHYSICAL EXAM:  GENERAL: NAD, well-groomed, well-developed  HEAD:  Atraumatic, Normocephalic  EYES: EOMI, PERRLA, conjunctiva and sclera clear  ENMT: No tonsillar erythema, exudates, or enlargement; Moist mucous membranes, Good dentition, No lesions  NECK: Supple, No JVD, Normal thyroid  NERVOUS SYSTEM:  Alert & Oriented X3, Good concentration; Motor Strength 5/5 B/L upper and lower extremities; DTRs 2+ intact and symmetric  CHEST/LUNG: b/l cleat lung.  HEART: Regular rate and rhythm; No murmurs, rubs, or gallops  ABDOMEN: Soft, Nontender, Nondistended; Bowel sounds present  EXTREMITIES:  2+ Peripheral Pulses, No clubbing, cyanosis, or edema  LYMPH: No lymphadenopathy noted  SKIN: No rashes or lesions    Care Discussed with Consultants/Other Providers [ ] YES  [ ] NO    ALBUTerol/ipratropium for Nebulization 3 milliLiter(s) Nebulizer every 6 hours PRN Shortness of Breath and/or Wheezing  cefepime   IVPB 1000 milliGRAM(s) IV Intermittent every 12 hours  insulin lispro (HumaLOG) corrective regimen sliding scale   SubCutaneous three times a day before meals  methylPREDNISolone sodium succinate Injectable 40 milliGRAM(s) IV Push every 6 hours  tobramycin for Nebulization 300 milliGRAM(s) Inhalation two times a day      A/P multilobar complex pneumonia, , Bronchiactesis, CHALINO by history,     Pulmonary consult noted, continue present treatment as still dyspneic.  continue present treatement, d/w staff.

## 2018-05-28 LAB
GLUCOSE BLDC GLUCOMTR-MCNC: 107 MG/DL — HIGH (ref 70–99)
GLUCOSE BLDC GLUCOMTR-MCNC: 118 MG/DL — HIGH (ref 70–99)
GLUCOSE BLDC GLUCOMTR-MCNC: 123 MG/DL — HIGH (ref 70–99)
GLUCOSE BLDC GLUCOMTR-MCNC: 131 MG/DL — HIGH (ref 70–99)

## 2018-05-28 RX ADMIN — CEFEPIME 100 MILLIGRAM(S): 1 INJECTION, POWDER, FOR SOLUTION INTRAMUSCULAR; INTRAVENOUS at 05:44

## 2018-05-28 RX ADMIN — Medication 300 MILLIGRAM(S): at 19:50

## 2018-05-28 RX ADMIN — Medication 40 MILLIGRAM(S): at 12:32

## 2018-05-28 RX ADMIN — Medication 40 MILLIGRAM(S): at 05:43

## 2018-05-28 RX ADMIN — Medication 40 MILLIGRAM(S): at 00:11

## 2018-05-28 RX ADMIN — CEFEPIME 100 MILLIGRAM(S): 1 INJECTION, POWDER, FOR SOLUTION INTRAMUSCULAR; INTRAVENOUS at 16:56

## 2018-05-28 RX ADMIN — Medication 40 MILLIGRAM(S): at 23:32

## 2018-05-28 RX ADMIN — Medication 40 MILLIGRAM(S): at 17:03

## 2018-05-28 NOTE — PROGRESS NOTE ADULT - SUBJECTIVE AND OBJECTIVE BOX
INTERVAL HPI/OVERNIGHT EVENTS: no acute events overnight    VITAL SIGNS:  T(F): 98.2 (05-27-18 @ 21:01)  HR: 68 (05-27-18 @ 21:01)  BP: 129/83 (05-27-18 @ 21:01)  RR: 17 (05-27-18 @ 21:01)  SpO2: 95% (05-27-18 @ 21:01)  Wt(kg): --    PHYSICAL EXAM:    Constitutional: NAD  Respiratory: mild rhonchi b/l  Cardiovascular: S1,S2 Present  Gastrointestinal: ND, NT, BS++  Extremities: no edema  Neurological: alert and awake      MEDICATIONS  (STANDING):  cefepime   IVPB 1000 milliGRAM(s) IV Intermittent every 12 hours  insulin lispro (HumaLOG) corrective regimen sliding scale   SubCutaneous three times a day before meals  methylPREDNISolone sodium succinate Injectable 40 milliGRAM(s) IV Push every 6 hours  tobramycin for Nebulization 300 milliGRAM(s) Inhalation two times a day    MEDICATIONS  (PRN):  ALBUTerol/ipratropium for Nebulization 3 milliLiter(s) Nebulizer every 6 hours PRN Shortness of Breath and/or Wheezing      Allergies    rifampin (Other)    Intolerances        LABS: no labs today        RADIOLOGY & ADDITIONAL TESTS:  < from: CT Chest No Cont (05.26.18 @ 13:29) >  IMPRESSION: Acute on chronic multifocal lung pneumonia including but not   limited to CHALINO, grossly stable from 12/7.    < end of copied text >

## 2018-05-28 NOTE — PROGRESS NOTE ADULT - ASSESSMENT
58 years old female from home with PMH of Bronchiectasis and CHALINO presented to ED with c/o worsening sob and productive cough over past week     Problem/Plan - 1:  ·  Problem: Pneumonia.  Plan: -58 F with pmh Bronchiectasis and CHALINO presented to ED with c/o worsening sob and productive cough over past week, after failing outpatient therapy for PNA  -CXR: Asymmetric increased airspace opacity projecting within the medial right   lower lung field may represent developing pneumonia.  -On admission was tachycardic and tachypneic with elevated Lactate to 3.5  -s/p IV Zosyn and IV Levaquin and 1.5 L NS bolus  -Lactate normalized to 1.3  -c/w IV abx cefepime  -f/u Blood cultures.      Problem/Plan - 2:  ·  Problem: CHALINO (mycobacterium avium-intracellulare).  Plan: -Continue to follow up with out patient Pulmonologist.      Problem/Plan - 3:  ·  Problem: Prophylactic measure.  Plan: IMPROVE 58 years old female from home with PMH of Bronchiectasis and CHALINO presented to ED with c/o worsening sob and productive cough over past week     Problem/Plan - 1:  ·  Problem: Pneumonia.  Plan: - worsening sob and productive cough over past week, failing outpatient therapy for PNA  CT chest showed Acute on chronic multifocal lung pneumonia i  -c/w IV abx cefepime  -Blood cultures no growth  -Tobramycin for neb as psuedomonas found on outpt work up     Problem/Plan - 2:  ·  Problem: CHALINO (mycobacterium avium-intracellulare).  Plan: -Continue to follow up with out patient Pulmonologist.      Problem/Plan - 3:  ·  Problem: Prophylactic measure.  Plan: IMPROVE score 0, no dvt ppx needed

## 2018-05-29 ENCOUNTER — TRANSCRIPTION ENCOUNTER (OUTPATIENT)
Age: 58
End: 2018-05-29

## 2018-05-29 VITALS
DIASTOLIC BLOOD PRESSURE: 73 MMHG | TEMPERATURE: 98 F | RESPIRATION RATE: 16 BRPM | SYSTOLIC BLOOD PRESSURE: 137 MMHG | OXYGEN SATURATION: 97 % | HEART RATE: 55 BPM

## 2018-05-29 LAB
ANION GAP SERPL CALC-SCNC: 7 MMOL/L — SIGNIFICANT CHANGE UP (ref 5–17)
BUN SERPL-MCNC: 19 MG/DL — HIGH (ref 7–18)
CALCIUM SERPL-MCNC: 8.4 MG/DL — SIGNIFICANT CHANGE UP (ref 8.4–10.5)
CHLORIDE SERPL-SCNC: 106 MMOL/L — SIGNIFICANT CHANGE UP (ref 96–108)
CO2 SERPL-SCNC: 26 MMOL/L — SIGNIFICANT CHANGE UP (ref 22–31)
CREAT SERPL-MCNC: 0.74 MG/DL — SIGNIFICANT CHANGE UP (ref 0.5–1.3)
GLUCOSE BLDC GLUCOMTR-MCNC: 105 MG/DL — HIGH (ref 70–99)
GLUCOSE BLDC GLUCOMTR-MCNC: 108 MG/DL — HIGH (ref 70–99)
GLUCOSE SERPL-MCNC: 114 MG/DL — HIGH (ref 70–99)
HCT VFR BLD CALC: 42.5 % — SIGNIFICANT CHANGE UP (ref 34.5–45)
HGB BLD-MCNC: 13.7 G/DL — SIGNIFICANT CHANGE UP (ref 11.5–15.5)
MAGNESIUM SERPL-MCNC: 2.6 MG/DL — SIGNIFICANT CHANGE UP (ref 1.6–2.6)
MCHC RBC-ENTMCNC: 30.1 PG — SIGNIFICANT CHANGE UP (ref 27–34)
MCHC RBC-ENTMCNC: 32.1 GM/DL — SIGNIFICANT CHANGE UP (ref 32–36)
MCV RBC AUTO: 93.8 FL — SIGNIFICANT CHANGE UP (ref 80–100)
PHOSPHATE SERPL-MCNC: 2.7 MG/DL — SIGNIFICANT CHANGE UP (ref 2.5–4.5)
PLATELET # BLD AUTO: 285 K/UL — SIGNIFICANT CHANGE UP (ref 150–400)
POTASSIUM SERPL-MCNC: 4.3 MMOL/L — SIGNIFICANT CHANGE UP (ref 3.5–5.3)
POTASSIUM SERPL-SCNC: 4.3 MMOL/L — SIGNIFICANT CHANGE UP (ref 3.5–5.3)
RBC # BLD: 4.54 M/UL — SIGNIFICANT CHANGE UP (ref 3.8–5.2)
RBC # FLD: 11.9 % — SIGNIFICANT CHANGE UP (ref 10.3–14.5)
SODIUM SERPL-SCNC: 139 MMOL/L — SIGNIFICANT CHANGE UP (ref 135–145)
WBC # BLD: 11.5 K/UL — HIGH (ref 3.8–10.5)
WBC # FLD AUTO: 11.5 K/UL — HIGH (ref 3.8–10.5)

## 2018-05-29 PROCEDURE — 84443 ASSAY THYROID STIM HORMONE: CPT

## 2018-05-29 PROCEDURE — 71250 CT THORAX DX C-: CPT

## 2018-05-29 PROCEDURE — 83036 HEMOGLOBIN GLYCOSYLATED A1C: CPT

## 2018-05-29 PROCEDURE — 83605 ASSAY OF LACTIC ACID: CPT

## 2018-05-29 PROCEDURE — 87040 BLOOD CULTURE FOR BACTERIA: CPT

## 2018-05-29 PROCEDURE — 82553 CREATINE MB FRACTION: CPT

## 2018-05-29 PROCEDURE — 96374 THER/PROPH/DIAG INJ IV PUSH: CPT

## 2018-05-29 PROCEDURE — 87070 CULTURE OTHR SPECIMN AEROBIC: CPT

## 2018-05-29 PROCEDURE — 80061 LIPID PANEL: CPT

## 2018-05-29 PROCEDURE — 83735 ASSAY OF MAGNESIUM: CPT

## 2018-05-29 PROCEDURE — 82306 VITAMIN D 25 HYDROXY: CPT

## 2018-05-29 PROCEDURE — 94640 AIRWAY INHALATION TREATMENT: CPT

## 2018-05-29 PROCEDURE — 87581 M.PNEUMON DNA AMP PROBE: CPT

## 2018-05-29 PROCEDURE — 82550 ASSAY OF CK (CPK): CPT

## 2018-05-29 PROCEDURE — 87486 CHLMYD PNEUM DNA AMP PROBE: CPT

## 2018-05-29 PROCEDURE — 93005 ELECTROCARDIOGRAM TRACING: CPT

## 2018-05-29 PROCEDURE — 82962 GLUCOSE BLOOD TEST: CPT

## 2018-05-29 PROCEDURE — 84484 ASSAY OF TROPONIN QUANT: CPT

## 2018-05-29 PROCEDURE — 87449 NOS EACH ORGANISM AG IA: CPT

## 2018-05-29 PROCEDURE — 84145 PROCALCITONIN (PCT): CPT

## 2018-05-29 PROCEDURE — 87633 RESP VIRUS 12-25 TARGETS: CPT

## 2018-05-29 PROCEDURE — 87798 DETECT AGENT NOS DNA AMP: CPT

## 2018-05-29 PROCEDURE — 82607 VITAMIN B-12: CPT

## 2018-05-29 PROCEDURE — 80053 COMPREHEN METABOLIC PANEL: CPT

## 2018-05-29 PROCEDURE — 80048 BASIC METABOLIC PNL TOTAL CA: CPT

## 2018-05-29 PROCEDURE — 96375 TX/PRO/DX INJ NEW DRUG ADDON: CPT

## 2018-05-29 PROCEDURE — 99285 EMERGENCY DEPT VISIT HI MDM: CPT | Mod: 25

## 2018-05-29 PROCEDURE — 84100 ASSAY OF PHOSPHORUS: CPT

## 2018-05-29 PROCEDURE — 85027 COMPLETE CBC AUTOMATED: CPT

## 2018-05-29 PROCEDURE — 82746 ASSAY OF FOLIC ACID SERUM: CPT

## 2018-05-29 PROCEDURE — 71045 X-RAY EXAM CHEST 1 VIEW: CPT

## 2018-05-29 RX ORDER — CIPROFLOXACIN LACTATE 400MG/40ML
1 VIAL (ML) INTRAVENOUS
Qty: 3 | Refills: 0 | OUTPATIENT
Start: 2018-05-29 | End: 2018-05-31

## 2018-05-29 RX ORDER — ALENDRONATE SODIUM 70 MG/1
0 TABLET ORAL
Qty: 4 | Refills: 0 | COMMUNITY

## 2018-05-29 RX ORDER — SODIUM CHLORIDE 9 MG/ML
0 INJECTION INTRAMUSCULAR; INTRAVENOUS; SUBCUTANEOUS
Qty: 240 | Refills: 0 | COMMUNITY

## 2018-05-29 RX ORDER — ERGOCALCIFEROL 1.25 MG/1
0 CAPSULE ORAL
Qty: 4 | Refills: 0 | COMMUNITY

## 2018-05-29 RX ADMIN — Medication 40 MILLIGRAM(S): at 11:46

## 2018-05-29 RX ADMIN — Medication 40 MILLIGRAM(S): at 05:31

## 2018-05-29 RX ADMIN — CEFEPIME 100 MILLIGRAM(S): 1 INJECTION, POWDER, FOR SOLUTION INTRAMUSCULAR; INTRAVENOUS at 05:32

## 2018-05-29 NOTE — PROGRESS NOTE ADULT - SUBJECTIVE AND OBJECTIVE BOX
Patient is a 58y old  Female who presents with a chief complaint of worsening sob and productive cough (25 May 2018 05:41)      INTERVAL HPI/OVERNIGHT EVENTS:  feeling much better    VITAL SIGNS:  T(F): 97.6 (05-29-18 @ 05:30)  HR: 78 (05-29-18 @ 05:30)  BP: 133/63 (05-29-18 @ 05:30)  RR: 18 (05-29-18 @ 05:30)  SpO2: 95% (05-29-18 @ 05:30)  Wt(kg): --  I&O's Detail          REVIEW OF SYSTEMS:    CONSTITUTIONAL:  No fevers, chills, sweats    HEENT:  Eyes:  No diplopia or blurred vision. ENT:  No earache, sore throat or runny nose.    CARDIOVASCULAR:  No pressure, squeezing, tightness, or heaviness about the chest; no palpitations.    RESPIRATORY:  Per HPI    GASTROINTESTINAL:  No abdominal pain, nausea, vomiting or diarrhea.    GENITOURINARY:  No dysuria, frequency or urgency.    NEUROLOGIC:  No paresthesias, fasciculations, seizures or weakness.    PSYCHIATRIC:  No disorder of thought or mood.      PHYSICAL EXAM:    Constitutional:no complaints  ENMT:atnc  Neck:supple  Respiratory:clear  Cardiovascular:rr  Gastrointestinal:soft  Extremities:no edema  Vascular:intact  Neurological:non focal  Musculoskeletal:no edema, normal rom    MEDICATIONS  (STANDING):  cefepime   IVPB 1000 milliGRAM(s) IV Intermittent every 12 hours  insulin lispro (HumaLOG) corrective regimen sliding scale   SubCutaneous three times a day before meals  methylPREDNISolone sodium succinate Injectable 40 milliGRAM(s) IV Push every 6 hours  tobramycin for Nebulization 300 milliGRAM(s) Inhalation two times a day    MEDICATIONS  (PRN):  ALBUTerol/ipratropium for Nebulization 3 milliLiter(s) Nebulizer every 6 hours PRN Shortness of Breath and/or Wheezing  guaiFENesin   Syrup  (Sugar-Free) 100 milliGRAM(s) Oral every 6 hours PRN Cough      Allergies    rifampin (Other)            LABS:                        13.7   11.5  )-----------( 285      ( 29 May 2018 07:39 )             42.5     05-29    139  |  106  |  19<H>  ----------------------------<  114<H>  4.3   |  26  |  0.74    Ca    8.4      29 May 2018 07:39  Phos  2.7     05-29  Mg     2.6     05-29                CAPILLARY BLOOD GLUCOSE      POCT Blood Glucose.: 105 mg/dL (29 May 2018 07:56)  POCT Blood Glucose.: 131 mg/dL (28 May 2018 21:50)  POCT Blood Glucose.: 118 mg/dL (28 May 2018 16:40)  POCT Blood Glucose.: 123 mg/dL (28 May 2018 12:24)        RADIOLOGY & ADDITIONAL TESTS:    CXR:    Ct scan chest:    ekg;    echo:

## 2018-05-29 NOTE — DISCHARGE NOTE ADULT - CARE PLAN
Principal Discharge DX:	Pneumonia of right lower lobe due to infectious organism  Goal:	to treat it  Assessment and plan of treatment:	continue medicines as prescribed. f/u PMD and pulmonologist within 1 week  Secondary Diagnosis:	Bronchiectasis with acute exacerbation

## 2018-05-29 NOTE — PROGRESS NOTE ADULT - ASSESSMENT
EXACERBATION BRONCHIECTASIS-IMPROVED  CHALINO  HYPOTHYROID    PLAN; PO PREDNISONE-TAPER OVER NEXT WEEK           NEBS/4 DAYS CEFTIN 500 BID           STABLE FOR DISCHARGE FROM PULMONARY VIEW           F/U WITH HER OWN PULMONOLGIST EXACERBATION BRONCHIECTASIS-IMPROVED  CHALINO  HYPOTHYROID    PLAN; PO PREDNISONE-TAPER OVER NEXT WEEK           NEBS/4 DAYS LEVAQUIN 500/D           STABLE FOR DISCHARGE FROM PULMONARY VIEW           F/U WITH HER OWN PULMONOLGIST

## 2018-05-29 NOTE — DISCHARGE NOTE ADULT - HOSPITAL COURSE
58 years old female from home with PMH of Bronchiectasis and CHALINO presented to ED with c/o worsening sob and productive cough over past week    1. Pneumonia and worsened Bronchectasis: - worsening sob and productive cough over past week, failing outpatient therapy for PNA  CT chest showed Acute on chronic multifocal lung pneumonia  and bronchectasis  -pt was on IV abx cefepime for 4 days and discharging on 3 days of levaquin  -Blood cultures no growth  -Tobramycin for neb as psuedomonas found on outpt work up  -robitussin for cough    2. CHALINO (mycobacterium avium-intracellulare): -Continue to follow up with out patient Pulmonologist.     Pt is stable to be discharged home as per Attending

## 2018-05-29 NOTE — DISCHARGE NOTE ADULT - PATIENT PORTAL LINK FT
You can access the [facility] Patient Portal, offered by [facility], by registering with the following website: [email or web address]

## 2018-05-29 NOTE — DISCHARGE NOTE ADULT - MEDICATION SUMMARY - MEDICATIONS TO TAKE
I will START or STAY ON the medications listed below when I get home from the hospital:    predniSONE 10 mg oral tablet  -- 3 tab(s) by mouth once a day for 3 days  2 tab(s) by mouth once a day for 3 days  1 tab(s) by mouth once a day for 3 days and then stop    -- It is very important that you take or use this exactly as directed.  Do not skip doses or discontinue unless directed by your doctor.  Obtain medical advice before taking any non-prescription drugs as some may affect the action of this medication.  Take with food or milk.    -- Indication: For Bronchiectasis    TOBRAMYCIN   /5ML  -- Indication: For Bronchiectasis    guaiFENesin 100 mg/5 mL oral liquid  -- 5 milliliter(s) by mouth every 6 hours, As needed, Cough  -- Indication: For Pneumonia    Levaquin 500 mg oral tablet  -- 1 tab(s) by mouth once a day   -- Avoid prolonged or excessive exposure to direct and/or artificial sunlight while taking this medication.  Do not take dairy products, antacids, or iron preparations within one hour of this medication.  Finish all this medication unless otherwise directed by prescriber.  May cause drowsiness or dizziness.  Medication should be taken with plenty of water.    -- Indication: For Pneumonia

## 2018-05-30 LAB
CULTURE RESULTS: SIGNIFICANT CHANGE UP
CULTURE RESULTS: SIGNIFICANT CHANGE UP
SPECIMEN SOURCE: SIGNIFICANT CHANGE UP
SPECIMEN SOURCE: SIGNIFICANT CHANGE UP

## 2018-05-31 ENCOUNTER — APPOINTMENT (OUTPATIENT)
Dept: PULMONOLOGY | Facility: CLINIC | Age: 58
End: 2018-05-31
Payer: COMMERCIAL

## 2018-05-31 VITALS
BODY MASS INDEX: 18.47 KG/M2 | OXYGEN SATURATION: 96 % | RESPIRATION RATE: 12 BRPM | SYSTOLIC BLOOD PRESSURE: 110 MMHG | HEART RATE: 78 BPM | DIASTOLIC BLOOD PRESSURE: 70 MMHG | TEMPERATURE: 98.1 F | WEIGHT: 111 LBS

## 2018-05-31 PROBLEM — A31.0 PULMONARY MYCOBACTERIAL INFECTION: Chronic | Status: ACTIVE | Noted: 2018-05-24

## 2018-05-31 PROBLEM — J47.9 BRONCHIECTASIS, UNCOMPLICATED: Chronic | Status: ACTIVE | Noted: 2018-05-24

## 2018-05-31 LAB — PROCALCITONIN SERPL-MCNC: <0.05

## 2018-05-31 PROCEDURE — 94010 BREATHING CAPACITY TEST: CPT

## 2018-05-31 PROCEDURE — 99215 OFFICE O/P EST HI 40 MIN: CPT | Mod: 25

## 2018-06-01 ENCOUNTER — CLINICAL ADVICE (OUTPATIENT)
Age: 58
End: 2018-06-01

## 2018-06-01 ENCOUNTER — OTHER (OUTPATIENT)
Age: 58
End: 2018-06-01

## 2018-06-08 ENCOUNTER — RESULT REVIEW (OUTPATIENT)
Age: 58
End: 2018-06-08

## 2018-06-08 ENCOUNTER — APPOINTMENT (OUTPATIENT)
Dept: PULMONOLOGY | Facility: CLINIC | Age: 58
End: 2018-06-08
Payer: COMMERCIAL

## 2018-06-08 PROCEDURE — 94618 PULMONARY STRESS TESTING: CPT

## 2018-06-08 PROCEDURE — 94010 BREATHING CAPACITY TEST: CPT | Mod: 59

## 2018-06-08 PROCEDURE — 94729 DIFFUSING CAPACITY: CPT

## 2018-06-24 LAB — ACID FAST STN SPT: NORMAL

## 2018-07-05 ENCOUNTER — OTHER (OUTPATIENT)
Age: 58
End: 2018-07-05

## 2018-07-16 LAB
ACID FAST STN SPT: NORMAL
BACTERIA SPT CULT: NORMAL

## 2018-07-24 ENCOUNTER — APPOINTMENT (OUTPATIENT)
Dept: PULMONOLOGY | Facility: CLINIC | Age: 58
End: 2018-07-24
Payer: COMMERCIAL

## 2018-07-24 VITALS
BODY MASS INDEX: 17.94 KG/M2 | WEIGHT: 111.6 LBS | HEART RATE: 92 BPM | DIASTOLIC BLOOD PRESSURE: 70 MMHG | SYSTOLIC BLOOD PRESSURE: 110 MMHG | HEIGHT: 66 IN | TEMPERATURE: 98.8 F | OXYGEN SATURATION: 98 %

## 2018-07-24 VITALS — OXYGEN SATURATION: 97 % | HEART RATE: 68 BPM

## 2018-07-24 PROCEDURE — 99215 OFFICE O/P EST HI 40 MIN: CPT

## 2018-07-24 RX ORDER — LEVOFLOXACIN 500 MG/1
500 TABLET, FILM COATED ORAL DAILY
Qty: 14 | Refills: 0 | Status: DISCONTINUED | COMMUNITY
Start: 2018-05-22 | End: 2018-07-24

## 2018-07-25 LAB — BACTERIA SPT CULT: NORMAL

## 2018-08-08 ENCOUNTER — RX RENEWAL (OUTPATIENT)
Age: 58
End: 2018-08-08

## 2018-08-17 ENCOUNTER — CLINICAL ADVICE (OUTPATIENT)
Age: 58
End: 2018-08-17

## 2018-08-25 LAB — ACID FAST STN SPT: NORMAL

## 2018-09-19 LAB — ACID FAST STN SPT: NORMAL

## 2018-09-25 ENCOUNTER — APPOINTMENT (OUTPATIENT)
Dept: PULMONOLOGY | Facility: CLINIC | Age: 58
End: 2018-09-25
Payer: COMMERCIAL

## 2018-09-25 VITALS
DIASTOLIC BLOOD PRESSURE: 90 MMHG | SYSTOLIC BLOOD PRESSURE: 140 MMHG | WEIGHT: 111 LBS | HEART RATE: 92 BPM | BODY MASS INDEX: 17.84 KG/M2 | OXYGEN SATURATION: 97 % | HEIGHT: 66 IN

## 2018-09-25 PROCEDURE — 99215 OFFICE O/P EST HI 40 MIN: CPT

## 2018-10-12 ENCOUNTER — RX RENEWAL (OUTPATIENT)
Age: 58
End: 2018-10-12

## 2018-10-19 ENCOUNTER — CLINICAL ADVICE (OUTPATIENT)
Age: 58
End: 2018-10-19

## 2018-10-24 ENCOUNTER — RESULT REVIEW (OUTPATIENT)
Age: 58
End: 2018-10-24

## 2018-10-24 LAB
BACTERIA SPT CULT: NORMAL
BACTERIA SPT CULT: NORMAL

## 2018-10-26 LAB
BACTERIA SPT CULT: NORMAL
BACTERIA SPT CULT: NORMAL

## 2018-10-29 ENCOUNTER — RESULT REVIEW (OUTPATIENT)
Age: 58
End: 2018-10-29

## 2018-11-20 LAB — ACID FAST STN SPT: NORMAL

## 2018-11-27 ENCOUNTER — APPOINTMENT (OUTPATIENT)
Dept: PULMONOLOGY | Facility: CLINIC | Age: 58
End: 2018-11-27
Payer: COMMERCIAL

## 2018-11-27 VITALS
WEIGHT: 123 LBS | BODY MASS INDEX: 19.77 KG/M2 | HEIGHT: 66 IN | DIASTOLIC BLOOD PRESSURE: 82 MMHG | SYSTOLIC BLOOD PRESSURE: 120 MMHG | OXYGEN SATURATION: 94 % | TEMPERATURE: 98.7 F | HEART RATE: 124 BPM

## 2018-11-27 PROCEDURE — 99215 OFFICE O/P EST HI 40 MIN: CPT

## 2018-11-27 RX ORDER — FLUTICASONE FUROATE AND VILANTEROL TRIFENATATE 200; 25 UG/1; UG/1
200-25 POWDER RESPIRATORY (INHALATION)
Qty: 1 | Refills: 11 | Status: DISCONTINUED | COMMUNITY
Start: 2018-05-31 | End: 2018-11-27

## 2018-12-14 LAB
ACID FAST STN SPT: NORMAL
ACID FAST STN SPT: NORMAL

## 2018-12-19 ENCOUNTER — OTHER (OUTPATIENT)
Age: 58
End: 2018-12-19

## 2019-02-05 ENCOUNTER — CLINICAL ADVICE (OUTPATIENT)
Age: 59
End: 2019-02-05

## 2019-02-27 ENCOUNTER — OTHER (OUTPATIENT)
Age: 59
End: 2019-02-27

## 2019-03-04 ENCOUNTER — OTHER (OUTPATIENT)
Age: 59
End: 2019-03-04

## 2019-05-26 ENCOUNTER — TRANSCRIPTION ENCOUNTER (OUTPATIENT)
Age: 59
End: 2019-05-26

## 2019-06-04 ENCOUNTER — APPOINTMENT (OUTPATIENT)
Dept: PULMONOLOGY | Facility: CLINIC | Age: 59
End: 2019-06-04
Payer: COMMERCIAL

## 2019-06-04 VITALS
DIASTOLIC BLOOD PRESSURE: 42 MMHG | SYSTOLIC BLOOD PRESSURE: 122 MMHG | WEIGHT: 113 LBS | HEIGHT: 66 IN | TEMPERATURE: 98.4 F | BODY MASS INDEX: 18.16 KG/M2 | OXYGEN SATURATION: 99 % | HEART RATE: 62 BPM

## 2019-06-04 PROCEDURE — 94010 BREATHING CAPACITY TEST: CPT

## 2019-06-04 PROCEDURE — 99215 OFFICE O/P EST HI 40 MIN: CPT | Mod: 25

## 2019-06-04 PROCEDURE — 94729 DIFFUSING CAPACITY: CPT

## 2019-06-05 NOTE — PHYSICAL EXAM
[General Appearance - Well Developed] : well developed [Normal Appearance] : normal appearance [General Appearance - Well Nourished] : well nourished [Well Groomed] : well groomed [General Appearance - In No Acute Distress] : no acute distress [No Deformities] : no deformities [Normal Conjunctiva] : the conjunctiva exhibited no abnormalities [I] : I [Neck Appearance] : the appearance of the neck was normal [Heart Sounds] : normal S1 and S2 [Heart Rate And Rhythm] : heart rate and rhythm were normal [Edema] : no peripheral edema present [Murmurs] : no murmurs present [] : no respiratory distress [Respiration, Rhythm And Depth] : normal respiratory rhythm and effort [Exaggerated Use Of Accessory Muscles For Inspiration] : no accessory muscle use [Abdomen Soft] : soft [Abdomen Tenderness] : non-tender [Abnormal Walk] : normal gait [Cyanosis, Localized] : no localized cyanosis [Petechial Hemorrhages (___cm)] : no petechial hemorrhages [Nail Splinter Hemorrhages] : no splinter hemorrhages of the nails [Skin Color & Pigmentation] : normal skin color and pigmentation [No Focal Deficits] : no focal deficits [Oriented To Time, Place, And Person] : oriented to person, place, and time [Impaired Insight] : insight and judgment were intact [Affect] : the affect was normal [Memory Recent] : recent memory was not impaired [FreeTextEntry1] : no gross chest wall deformities [FreeTextEntry2] : no edema

## 2019-06-05 NOTE — END OF VISIT
[>50% of Time Spent on Counseling and Coordination of Care for  ___] : Greater than 50% of the encounter time was spent on counseling and coordination of care for [unfilled] [Time Spent: ___ minutes] : I have spent [unfilled] minutes of face to face time with the patient [FreeTextEntry3] : All medical record entries made by the Scribe were at my, Dr. Shawn Guillen's direction and personally dictated by me.   I have reviewed the chart and agree that the record accurately reflects my personal performance of the history, physical exam, assessment and plan. I have also personally directed, reviewed, and agreed with the chart.

## 2019-06-05 NOTE — CONSULT LETTER
[Consult Letter:] : I had the pleasure of evaluating your patient, [unfilled]. [Please see my note below.] : Please see my note below. [Consult Closing:] : Thank you very much for allowing me to participate in the care of this patient.  If you have any questions, please do not hesitate to contact me. [Sincerely,] : Sincerely, [Dear  ___] : Dear ~MARISABEL, [DrYasmani  ___] : Dr. ASENCIO [FreeTextEntry2] : Erica Snyder MD, \[place], [place] [FreeTextEntry3] : Shawn Guillen MD

## 2019-06-05 NOTE — HISTORY OF PRESENT ILLNESS
[Chest Pain Or Discomfort] : denies chest pain [Fever] : denies fever [Difficulty Breathing During Exertion] : improved dyspnea on exertion [Feelings Of Weakness On Exertion] : improved exercise intolerance [Cough] : denies coughing [Wheezing] : denies wheezing [FreeTextEntry1] : 57 y/o woman. Never smoker. Reports multiple episodes of pneumonia since 2000. 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 Initially treated with 2 drugs - ethambutol and erythromycin (daily dosing). Unclear why 2 drug were used.  After 13 months later her phlegm was still growing MAC and she had no improvement in her symptoms of cough and night sweats. No hemoptysis. She reports good compliance with her abx regimen. She was then started on rifampin to  her 2 drug regimen. She took it for about 1 month but did not tolerate it (confusion, forgetfulness) No GI symptoms. She reports not being on medications for MAC now for the last 12 months (profuse sweating at night and sometimes in the day, weight loss is minimal - 5lbs). Sister has bronchiectasis. She has NGTD in recent sputum cultures and negative AFB from 7/6 and 7/24. Was started on azithromycin TIW, hypertonic saline nebs, IS with aerobika, and VEST device was recommended. \par \par 9-25-18: Pt is here for a follow up. Overall doing very well, she is mid-cycle on her Gera and reports feeling more chest tightness and dyspnea when going up the stairs which resolves on her off months. Otherwise no infections or fevers, vaccinated for flu. Has been using her nebs religiously, aerobika 3 times/day and vest 2 times/day. \par

## 2019-06-05 NOTE — DISCUSSION/SUMMARY
[FreeTextEntry1] : Medical Attending's Pertinent Exam:\par 6-4-19\par I concur with exam:\par -no pallor, no icterus\par -nasal mucosa normal, no oral thrush; no exudates\par -no cervical lymphadenopathy, no JVD at 45 degrees, no hepatojugular reflux  \par -fine and coarse moist inspiratory crackles, pulmonary squeak short and mild \par -no edema \par \par \par

## 2019-06-05 NOTE — ASSESSMENT
[FreeTextEntry1] : 6-4-19\par It was a pleasure to see Pam for follow-up today. Her respiratory issues are:\par \par 1. Bronchiectasis\par Her work-up, including multiple AFB cultures, CF expanded panel, immunoglobulin levels, autoimmune panel, CGD, has been unremarkable. She has a sister with bronchiectasis.  She had been on inhaled tobramycin for Pseudomonas in the sputum. Subsequent sputum cultures done with Dr. Lena Reina have reportedly been negative and she has been off tobramycin since roughly 10/2018. Of note, the inhaled tobramycin did cause bronchospasm and worsening SOB during the months she was taking it which resolved after she stopped the medication.  Her medications have been adjusted by Dr. Reina and now include: Breo 200-25, Singulair, and hypertonic saline 3%.  She feels the best she's been in some time and her respiratory status is significantly improved. Denies cough, fever; only has intermittent scant sputum production. She uses the vest and Aerobika as she feels it's needed.  No significant obstruction on spirometry today; diffusion capacity is also normal. We will continue her current medications and will also check sputum cultures (C&S x 2 and AFB x 1). We will also follow-up with a CT chest now. \par \par 2. Health Care Maintenance \par Pam recalls receiving pneumococcal vaccination approximately 15 years ago.  We have discussed that she will be due for Prevnar when she turns 66 yo and then Pneumovax one year later. \par \par RTC in 4 months

## 2019-06-05 NOTE — PROCEDURE
[FreeTextEntry1] : Spirometry 19:\par FVC: 2.88 L (91%)\par FEV1: 2.10 L (82%)\par FEV1/FVC: 73%\par [id number]%: 1.40 L/s (50%)\par DLCO:  19.6 (109%)\par Normal spirometry. No significant obstruction. Normal diffusion capacity. \par \par Sputum cultures: 18 and 18 - AFB negative;  and  C&S NL estella\par \par Paulina, DLCO, 6MWT 18:\par FVC: 2.48 L (74%(\par FEV1: 1.65 L (64%)\par FEV1/FVC: 67%\par [id number]%: 0.77 L/s (31%)\par DLCO: 18.1 (83%)\par 6MWT: 427 meters, SpO2 start: 98% --> SpO2 end: 98%\par Mild expiratory airflow obstruction. Normal diffusion capacity. NL walk distance with no signifcant desaturation.\par \par Sputum culture results:\par AFB 18: negative\par C&S 18: NL estella x 2\par AFB 18: pending, no growth at 1 week\par C&S 18: NL estella\par \par Paulina 18\par FVC: 2.42 82%\par FEV1: 1.59 66%\par FEV1/FVC: 66%\par \par Paulina 3/29/18:\par FVC: 2.58 L (73%)\par FEV1: 1.85 L (6%)\par FEV1/FVC: 90%\par [id number]%: 1.20 L/s (46%)\par Mild obstructive defect. \par \par Sputum AFB 18: no growth at 6 weeks\par Sputum C&S 18: numerous mucin producing Pseudomonas aeruginosa\par Sputum C&S 18: numerous mucin producing Pseudomonas aeruginosa\par Sputa AFB x 3 from 2018: pending\par \par 18\par FVC: 3.07L (87%) --> 3.09L (87%)\par FEV1: 2.11L (76%) --> 2.17L (78%)\par FEV1/FVC: 69%\par \par T.02L (98%)\par DLCO: 103%\par \par 6MWT: 576meters. HR/Sp02. 89bpm/98% --> 116bpm/96% \par yeimi scale 2

## 2019-06-05 NOTE — REVIEW OF SYSTEMS
[As Noted in HPI] : as noted in HPI [Negative] : Endocrine [Fever] : no fever [Chills] : no chills [Fatigue] : no fatigue [Poor Appetite] : normal appetite  [Nasal Congestion] : no nasal congestion [Cough] : no cough [Sputum] : not coughing up ~M sputum [Hemoptysis] : no hemoptysis [Pleuritic Pain] : no pleuritic pain [Dyspnea] : no dyspnea [Chest Tightness] : no chest tightness [Wheezing] : no wheezing

## 2019-06-20 ENCOUNTER — FORM ENCOUNTER (OUTPATIENT)
Age: 59
End: 2019-06-20

## 2019-06-21 ENCOUNTER — OUTPATIENT (OUTPATIENT)
Dept: OUTPATIENT SERVICES | Facility: HOSPITAL | Age: 59
LOS: 1 days | End: 2019-06-21
Payer: COMMERCIAL

## 2019-06-21 ENCOUNTER — APPOINTMENT (OUTPATIENT)
Dept: CT IMAGING | Facility: HOSPITAL | Age: 59
End: 2019-06-21
Payer: COMMERCIAL

## 2019-06-21 PROCEDURE — 71250 CT THORAX DX C-: CPT | Mod: 26

## 2019-06-21 PROCEDURE — 71250 CT THORAX DX C-: CPT

## 2019-06-24 ENCOUNTER — RESULT REVIEW (OUTPATIENT)
Age: 59
End: 2019-06-24

## 2019-06-27 LAB
BACTERIA SPT CULT: NORMAL
BACTERIA SPT CULT: NORMAL

## 2019-08-10 LAB
ACID FAST STN SPT: NORMAL
ACID FAST STN SPT: NORMAL

## 2019-09-30 ENCOUNTER — FORM ENCOUNTER (OUTPATIENT)
Age: 59
End: 2019-09-30

## 2019-10-01 ENCOUNTER — OUTPATIENT (OUTPATIENT)
Dept: OUTPATIENT SERVICES | Facility: HOSPITAL | Age: 59
LOS: 1 days | End: 2019-10-01
Payer: COMMERCIAL

## 2019-10-01 ENCOUNTER — APPOINTMENT (OUTPATIENT)
Dept: PULMONOLOGY | Facility: CLINIC | Age: 59
End: 2019-10-01
Payer: COMMERCIAL

## 2019-10-01 VITALS
RESPIRATION RATE: 12 BRPM | SYSTOLIC BLOOD PRESSURE: 120 MMHG | OXYGEN SATURATION: 98 % | BODY MASS INDEX: 18.48 KG/M2 | HEIGHT: 66 IN | DIASTOLIC BLOOD PRESSURE: 80 MMHG | HEART RATE: 88 BPM | TEMPERATURE: 98.8 F | WEIGHT: 115 LBS

## 2019-10-01 DIAGNOSIS — Z23 ENCOUNTER FOR IMMUNIZATION: ICD-10-CM

## 2019-10-01 PROCEDURE — 90686 IIV4 VACC NO PRSV 0.5 ML IM: CPT

## 2019-10-01 PROCEDURE — 71046 X-RAY EXAM CHEST 2 VIEWS: CPT | Mod: 26

## 2019-10-01 PROCEDURE — 71046 X-RAY EXAM CHEST 2 VIEWS: CPT

## 2019-10-01 PROCEDURE — G0008: CPT

## 2019-10-01 PROCEDURE — 94010 BREATHING CAPACITY TEST: CPT

## 2019-10-01 PROCEDURE — 36415 COLL VENOUS BLD VENIPUNCTURE: CPT

## 2019-10-01 PROCEDURE — 99215 OFFICE O/P EST HI 40 MIN: CPT | Mod: 25

## 2019-10-02 ENCOUNTER — RESULT REVIEW (OUTPATIENT)
Age: 59
End: 2019-10-02

## 2019-10-02 NOTE — DISCUSSION/SUMMARY
[FreeTextEntry1] : Medical Attending's Pertinent Exam:\par 10-1-19\par -no pallor or icterus\par -no cervical lymph nodes\par -nasal mucosa is mildly erythematous \par -MP 1, no oral thrush\par -no JVD at 45 degrees, no hepatojugular reflux\par -no dullness, good air entry bilaterally, bilateral expiratory and inspiratory crackles, deep breathing evokes cough, occasional pulmonary squeaks on left \par -normal S1, S2\par -no pedal edema\par -no visible rash\par -no articular manifestations of RA\par \par Spirometry shows mild airflow obstruction.   Forced vital capacity is normal.   \par

## 2019-10-02 NOTE — REVIEW OF SYSTEMS
[As Noted in HPI] : as noted in HPI [Negative] : Psychiatric [Fever] : no fever [Chills] : no chills [Fatigue] : no fatigue [Poor Appetite] : normal appetite  [Nasal Congestion] : no nasal congestion [Cough] : no cough [Sputum] : not coughing up ~M sputum [Hemoptysis] : no hemoptysis [Dyspnea] : no dyspnea [Chest Tightness] : no chest tightness [Pleuritic Pain] : no pleuritic pain [Wheezing] : no wheezing

## 2019-10-02 NOTE — HISTORY OF PRESENT ILLNESS
[Wheezing] : denies wheezing [Chest Pain Or Discomfort] : denies chest pain [Fever] : denies fever [Difficulty Breathing During Exertion] : denies dyspnea on exertion [Feelings Of Weakness On Exertion] : denies exercise intolerance [FreeTextEntry1] : 58 y/o woman. Never smoker. \par \par Initial intake 1/30/18: Reports multiple episodes of pneumonia since 2000. 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 Initially treated with 2 drugs - ethambutol and erythromycin (daily dosing). Unclear why 2 drug were used.  After 13 months later her phlegm was still growing MAC and she had no improvement in her symptoms of cough and night sweats. No hemoptysis. She reports good compliance with her abx regimen. She was then started on rifampin to  her 2 drug regimen. She took it for about 1 month but did not tolerate it (confusion, forgetfulness) No GI symptoms. She reports not being on medications for MAC now for the last 12 months (profuse sweating at night and sometimes in the day, weight loss is minimal - 5lbs). Sister has bronchiectasis. She has NGTD in recent sputum cultures and negative AFB from 7/6 and 7/24. Was started on azithromycin TIW, hypertonic saline nebs, IS with aerobika, and VEST device was recommended. \par \par 10/1/19: Today feels pretty good. Gene fevers. Has occasional new drenching night sweats in the last 6 weeks. Appetite NL. Weight stable. Every few weeks may bring up some sputum, but finds it is loose and a greenish color. Has traveling back and forth frequently to [place] over the summer. No increase in SOB. \par Has not been on tobramycin\par On Breo 200-25, saline 3%, Singulair. Has been using Aerobika 2 times per day. Not using vest as frequently.

## 2019-10-02 NOTE — CONSULT LETTER
[Dear  ___] : Dear ~MARISABEL, [Consult Letter:] : I had the pleasure of evaluating your patient, [unfilled]. [Please see my note below.] : Please see my note below. [Consult Closing:] : Thank you very much for allowing me to participate in the care of this patient.  If you have any questions, please do not hesitate to contact me. [Sincerely,] : Sincerely, [DrYasmani  ___] : Dr. ASENCIO [FreeTextEntry2] : Erica Snyder MD, \[place], [place] [FreeTextEntry3] : Shawn Guillen MD

## 2019-10-02 NOTE — ASSESSMENT
[FreeTextEntry1] : 10-1-19\par It was a pleasure to see Pam for follow-up today. Her respiratory issues are:\par \par 1. Bronchiectasis\par Today, there are more crackling sounds on exam and she seems more short of breath. This should be considered in conjunction with her new night sweats and the fact that she is not on inhaled antibiotics but is on inhaled corticosteroids (known to increase risk of pneumonia). \par We will send sputum for C&S (not AFB, as we have 12 negative AFB sputum cultures); she is unable to produce a sample in the office so she will drop off sputum samples at the lab tomorrow. We will also send CBC and procalcitonin. We will get a CXR as well. \par My feeling is that we will likely give Pam a course of antibiotics, but I would like to review test results prior to making this decision as she has had 11 negative sputum cultures (C&S) since 4/17/18. \par In the meantime, we may also consider giving her an aerosolized antibiotic depending on what the sputum culture results are and the sensitivities of the organisms grown on her cultures.\par In the meantime, I advised that she use hypertonic saline BID and increase Aerobika use to 3-4 times per day. I also advised that she resume regular vest use.\par We will continue Breo for now as she does have evidence of congestion in the lungs. Her FVC and FEV1 today were both reduced as compared to her last spirometry.\par At her next visit, when she is over her acute infection, we will discuss whether or not we will perform a bronchoscopy. I believe she has MAC, however, we have not been able to isolate it on any of her sputum AFB cultures thus far. \par \par 2. Cystic changes in RML and lingular segment\par These are highly suggestive of MAC. Other risk factors for MAC are being a middle aged woman who has middle lobe and lingular segment disease. It is perplexing that multiple sputa have come back negative for AFB. We discussed about other investigations including bronchoscopy with BAL. We are comforted in the fact that in reviewing the CT chest from 9/24/15, there has been no significant progression of the cystic changes since then. \par There was a new 9 mm RUL nodule on her CT chest from 6/21/19, we will get a 6 month follow-up CT (low-dose) to monitor.\par \par 3. Health maintenance \par Pam recalls receiving pneumococcal vaccination approximately 15 years ago.  We have discussed that she will be due for Prevnar when she turns 68 yo. We will administer Pneumovax (23 valent) and influenza vaccination today. \par \par Return to clinic: 2 months

## 2019-10-02 NOTE — PHYSICAL EXAM
[General Appearance - Well Developed] : well developed [Well Groomed] : well groomed [Normal Appearance] : normal appearance [No Deformities] : no deformities [General Appearance - Well Nourished] : well nourished [General Appearance - In No Acute Distress] : no acute distress [Normal Conjunctiva] : the conjunctiva exhibited no abnormalities [I] : I [Neck Appearance] : the appearance of the neck was normal [Heart Rate And Rhythm] : heart rate and rhythm were normal [Murmurs] : no murmurs present [Heart Sounds] : normal S1 and S2 [Edema] : no peripheral edema present [] : no respiratory distress [Respiration, Rhythm And Depth] : normal respiratory rhythm and effort [Exaggerated Use Of Accessory Muscles For Inspiration] : no accessory muscle use [Abdomen Soft] : soft [Abdomen Tenderness] : non-tender [Abnormal Walk] : normal gait [Cyanosis, Localized] : no localized cyanosis [Nail Splinter Hemorrhages] : no splinter hemorrhages of the nails [Petechial Hemorrhages (___cm)] : no petechial hemorrhages [Skin Color & Pigmentation] : normal skin color and pigmentation [Oriented To Time, Place, And Person] : oriented to person, place, and time [No Focal Deficits] : no focal deficits [Impaired Insight] : insight and judgment were intact [Affect] : the affect was normal [Memory Recent] : recent memory was not impaired [Jugular Venous Distention Increased] : there was no jugular-venous distention [Gait - Sufficient For Exercise Testing] : the gait was sufficient for exercise testing [FreeTextEntry2] : no pedal edema  [FreeTextEntry1] : 1+ clubbing on fingers of left hand; no articular manifestations of rheumatologic disease

## 2019-10-02 NOTE — PROCEDURE
[FreeTextEntry1] : Spirometry 10/1/19:\par FVC: 2.60 L (84%)\par FEV1: 1.89 L (75%)\par FEV1/FVC: 73%\par [id number]%:  1.30 L/s (48%)\par FVC normal. Mild airflow obstruction.\par \par EXAM: CT CHEST PROCEDURE DATE: 2019 \par There is a new 9 mm subpleural nodule within the right upper lobe (series 4, image 102) with adjacent tubular branching opacities noted inferiorly, which are slightly progressed as compared to previous examination. Additional areas of scattered tree-in-bud nodularity demonstrate stable to slightly \par increased prominence, including increased nodularity within the superior segment of the left lower lobe (series 4, image 21). Areas of bronchiectasis within the lingula and right middle lobe are not significantly changed. No effusion, consolidation or pneumothorax. \par The central airways are patent and normal. \par The heart size and pulmonary vascularity are normal. \par No significant hilar or mediastinal adenopathy is present. \par Images of the upper abdomen are unremarkable. The osseous structures are unchanged including scattered bone islands and degenerative changes. \par Impression: \par There is sequelae of small airways disease with or without infectious/inflammatory component. Specifically, there is stable to slightly increased prominence of scattered tree-in-bud nodules as well as unchanged moderate right middle lobe and lingular bronchiectasis, for which a background of CHALINO cannot be excluded, among other potential etiologies. \par There is a new 9 mm right upper lobe nodule likely in keeping with sequelae of mucus impaction/small airways disease. However, follow-up within 3-6 months is recommended to assess for stability. \par \par Spirometry 19:\par FVC: 2.88 L (91%)\par FEV1: 2.10 L (82%)\par FEV1/FVC: 73%\par [id number]%: 1.40 L/s (50%)\par DLCO:  19.6 (109%)\par Normal spirometry. No significant obstruction. Normal diffusion capacity. \par \par Sputum cultures: 18 and 18 - AFB negative;  and  C&S NL estella\par \par [place], DLCO, 6MWT 18:\par FVC: 2.48 L (74%(\par FEV1: 1.65 L (64%)\par FEV1/FVC: 67%\par [id number]%: 0.77 L/s (31%)\par DLCO: 18.1 (83%)\par 6MWT: 427 meters, SpO2 start: 98% --> SpO2 end: 98%\par Mild expiratory airflow obstruction. Normal diffusion capacity. NL walk distance with no signifcant desaturation.\par \par Sputum culture results:\par AFB 18: negative\par C&S 18: NL estella x 2\par AFB 18: pending, no growth at 1 week\par C&S 18: NL estella\par \par [place] 18\par FVC: 2.42 82%\par FEV1: 1.59 66%\par FEV1/FVC: 66%\par \par [place] 3/29/18:\par FVC: 2.58 L (73%)\par FEV1: 1.85 L (6%)\par FEV1/FVC: 90%\par [id number]%: 1.20 L/s (46%)\par Mild obstructive defect. \par \par Sputum AFB 18: no growth at 6 weeks\par Sputum C&S 18: numerous mucin producing Pseudomonas aeruginosa\par Sputum C&S 18: numerous mucin producing Pseudomonas aeruginosa\par Sputa AFB x 3 from 2018: pending\par \par 18\par FVC: 3.07L (87%) --> 3.09L (87%)\par FEV1: 2.11L (76%) --> 2.17L (78%)\par FEV1/FVC: 69%\par \par T.02L (98%)\par DLCO: 103%\par \par 6MWT: 576meters. HR/Sp02. 89bpm/98% --> 116bpm/96% \par yiemi scale 2

## 2019-10-03 LAB
BASOPHILS # BLD AUTO: 0.08 K/UL
BASOPHILS NFR BLD AUTO: 1.3 %
EOSINOPHIL # BLD AUTO: 0.22 K/UL
EOSINOPHIL NFR BLD AUTO: 3.6 %
HCT VFR BLD CALC: 43.4 %
HGB BLD-MCNC: 13.6 G/DL
IMM GRANULOCYTES NFR BLD AUTO: 0.2 %
LYMPHOCYTES # BLD AUTO: 1.25 K/UL
LYMPHOCYTES NFR BLD AUTO: 20.4 %
MAN DIFF?: NORMAL
MCHC RBC-ENTMCNC: 30.2 PG
MCHC RBC-ENTMCNC: 31.3 GM/DL
MCV RBC AUTO: 96.2 FL
MONOCYTES # BLD AUTO: 0.55 K/UL
MONOCYTES NFR BLD AUTO: 9 %
NEUTROPHILS # BLD AUTO: 4.01 K/UL
NEUTROPHILS NFR BLD AUTO: 65.5 %
PLATELET # BLD AUTO: 255 K/UL
PROCALCITONIN SERPL-MCNC: <0.02 NG/ML
RBC # BLD: 4.51 M/UL
RBC # FLD: 12.9 %
WBC # FLD AUTO: 6.12 K/UL

## 2019-10-04 ENCOUNTER — RESULT REVIEW (OUTPATIENT)
Age: 59
End: 2019-10-04

## 2019-10-04 RX ORDER — ARFORMOTEROL TARTRATE 15 UG/2ML
15 SOLUTION RESPIRATORY (INHALATION) TWICE DAILY
Qty: 1 | Refills: 3 | Status: DISCONTINUED | COMMUNITY
Start: 2018-11-27 | End: 2019-10-04

## 2019-10-04 RX ORDER — TOBRAMYCIN 300 MG/5ML
300 SOLUTION RESPIRATORY (INHALATION)
Qty: 1 | Refills: 5 | Status: DISCONTINUED | COMMUNITY
Start: 2018-03-22 | End: 2019-10-04

## 2019-10-06 ENCOUNTER — RESULT REVIEW (OUTPATIENT)
Age: 59
End: 2019-10-06

## 2019-10-06 LAB
BACTERIA SPT CULT: NORMAL
BACTERIA SPT CULT: NORMAL

## 2019-12-03 ENCOUNTER — APPOINTMENT (OUTPATIENT)
Dept: PULMONOLOGY | Facility: CLINIC | Age: 59
End: 2019-12-03
Payer: COMMERCIAL

## 2019-12-03 VITALS
WEIGHT: 118.2 LBS | HEIGHT: 66 IN | BODY MASS INDEX: 18.99 KG/M2 | SYSTOLIC BLOOD PRESSURE: 122 MMHG | HEART RATE: 67 BPM | DIASTOLIC BLOOD PRESSURE: 80 MMHG | OXYGEN SATURATION: 98 % | TEMPERATURE: 98.3 F

## 2019-12-03 PROCEDURE — 99215 OFFICE O/P EST HI 40 MIN: CPT

## 2019-12-05 NOTE — ASSESSMENT
[FreeTextEntry1] : 12-3-19:\par It was a pleasure to see Pam for follow-up today. Her respiratory issues are:\par \par 1. Bronchiectasis\par On exam today, the crackling sounds have come back to her baseline and this correlates with how she is feeling. She is continuing to use airway clearance devices, including Aerobika, hypertonic saline via nebulizer, and vest. We have advised her to try to use these as prescribed as much as possible. We have sent off multiple sputum cultures on her, and they have all been unremarkable, including the last two sputum samples from October 2019. We will continue to periodically send sputum for culture. Spirometry today demonstrates a mild obstructive defect.\par \par The pattern of bronchiectasis  in a middle aged women affecting the RML and lingular segments suggests CHALINO, however we have not isolated CHALINO on any AFB cultures thus far. We have requested Pam to collect sputum at home and bring it back for AFB culture. Should her symptoms becomes worse or she develop bronchiectasis exacerbations or pneumonias requiring antibiotics, then we would recommend a bronchoscopy and BAL to look for CHALINO.  \par \par 2. Pulmonary nodule\par On Pam's CT chest from 6/21/19, in addition to cystic bronchiectasis, there was also evidence of a RUL nodule above the fissure this showed and area of focal hyperlucency suggestive of cavitation. However, this may also represent an airway in the nodule and may not be a true cavity. She is not toxic and infection is unlikely. We will repeat a low-dose CT chest now (6 months after the last) to follow-up this nodule. \par \par 3. Health maintenance\par Continue walking on a daily basis. She is up to date with her influenza and pneumococcal vaccinations.\par \par Return to clinic: 6 months

## 2019-12-05 NOTE — DISCUSSION/SUMMARY
[FreeTextEntry1] : Medical Attending's Pertinent Exam:\par 12-3-19\par -no pallor or icterus \par -no cervical adenopathy, no JVD at 45 deg, no hepatojugular reflux \par -no dullness, good air entry BL, no wheezing, rhonchi, occasional pulmonary squeaks R > L, few inspiratory crackles both lungs nury R base posteriorly and R interscapular area \par -normal S1, S2, no murmurs, rubs, gallops \par -no cyanosis or clubbing \par -no visible rash \par -no pedal edema

## 2019-12-05 NOTE — REVIEW OF SYSTEMS
[As Noted in HPI] : as noted in HPI [Negative] : Pulmonary Hypertension [Fever] : no fever [Chills] : no chills [Fatigue] : no fatigue [Nasal Congestion] : no nasal congestion [Poor Appetite] : normal appetite  [Cough] : no cough [Sputum] : not coughing up ~M sputum [Hemoptysis] : no hemoptysis [Chest Tightness] : no chest tightness [Dyspnea] : no dyspnea [Pleuritic Pain] : no pleuritic pain [Wheezing] : no wheezing

## 2019-12-05 NOTE — HISTORY OF PRESENT ILLNESS
[Difficulty Breathing During Exertion] : denies dyspnea on exertion [Feelings Of Weakness On Exertion] : denies exercise intolerance [Wheezing] : denies wheezing [Chest Pain Or Discomfort] : denies chest pain [Fever] : denies fever [Cough] : denies coughing [FreeTextEntry1] : 58 y/o woman. Never smoker. \par \par Initial intake 1/30/18: Reports multiple episodes of pneumonia since 2000. 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 Initially treated with 2 drugs - ethambutol and erythromycin (daily dosing). Unclear why 2 drug were used.  After 13 months later her phlegm was still growing MAC and she had no improvement in her symptoms of cough and night sweats. No hemoptysis. She reports good compliance with her abx regimen. She was then started on rifampin to  her 2 drug regimen. She took it for about 1 month but did not tolerate it (confusion, forgetfulness) No GI symptoms. She reports not being on medications for MAC now for the last 12 months (profuse sweating at night and sometimes in the day, weight loss is minimal - 5lbs). Sister has bronchiectasis. She has NGTD in recent sputum cultures and negative AFB from 7/6 and 7/24. Was started on azithromycin TIW, hypertonic saline nebs, IS with aerobika, and VEST device was recommended. \par \par 12-3-19:\par She is walking daily, 30 minutes per day, without SOB. \par No cough. No fevers. \par Using Aerobika once per day and nebulized saline 7%. Also uses vest.

## 2019-12-05 NOTE — CONSULT LETTER
[Dear  ___] : Dear ~MARISABEL, [Consult Letter:] : I had the pleasure of evaluating your patient, [unfilled]. [Please see my note below.] : Please see my note below. [Sincerely,] : Sincerely, [Consult Closing:] : Thank you very much for allowing me to participate in the care of this patient.  If you have any questions, please do not hesitate to contact me. [DrYasmani  ___] : Dr. ASENCIO [FreeTextEntry3] : Shawn Guillen MD [FreeTextEntry2] : Erica Snyder MD, \[place], [place]

## 2019-12-05 NOTE — PROCEDURE
[FreeTextEntry1] : Spirometry 12/3/19:\par FVC: 2.71 L (75%)\par FEV1: 1.85 (66%)\par FEV1/FVC: 68%\par [id number]%: 0.99 L/s (38%)\par Mild obstructive defect. Mildly reduced FVC. \par \par EXAM: XR CHEST PA LAT 2V PROCEDURE DATE: 10/01/2019 \par Scattered increased lung markings. Increasing bronchiectasis/infiltrate left lower lung/lingula compared to prior chest x-ray 2018. No pleural effusion. No pneumothorax. Unremarkable cardiac silhouette. No acute bone abnormality. \par \par Spirometry 10/1/19:\par FVC: 2.60 L (84%)\par FEV1: 1.89 L (75%)\par FEV1/FVC: 73%\par [id number]%:  1.30 L/s (48%)\par FVC normal. Mild airflow obstruction.\par \par EXAM: CT CHEST PROCEDURE DATE: 2019 \par There is a new 9 mm subpleural nodule within the right upper lobe (series 4, image 102) with adjacent tubular branching opacities noted inferiorly, which are slightly progressed as compared to previous examination. Additional areas of scattered tree-in-bud nodularity demonstrate stable to slightly \par increased prominence, including increased nodularity within the superior segment of the left lower lobe (series 4, image 21). Areas of bronchiectasis within the lingula and right middle lobe are not significantly changed. No effusion, consolidation or pneumothorax. \par The central airways are patent and normal. \par The heart size and pulmonary vascularity are normal. \par No significant hilar or mediastinal adenopathy is present. \par Images of the upper abdomen are unremarkable. The osseous structures are unchanged including scattered bone islands and degenerative changes. \par Impression: \par There is sequelae of small airways disease with or without infectious/inflammatory component. Specifically, there is stable to slightly increased prominence of scattered tree-in-bud nodules as well as unchanged moderate right middle lobe and lingular bronchiectasis, for which a background of CHALINO cannot be excluded, among other potential etiologies. \par There is a new 9 mm right upper lobe nodule likely in keeping with sequelae of mucus impaction/small airways disease. However, follow-up within 3-6 months is recommended to assess for stability. \par \par Spirometry 19:\par FVC: 2.88 L (91%)\par FEV1: 2.10 L (82%)\par FEV1/FVC: 73%\par [id number]%: 1.40 L/s (50%)\par DLCO:  19.6 (109%)\par Normal spirometry. No significant obstruction. Normal diffusion capacity. \par \par Sputum cultures: 18 and 18 - AFB negative;  and  C&S NL estella\par \par [place], DLCO, 6MWT 18:\par FVC: 2.48 L (74%(\par FEV1: 1.65 L (64%)\par FEV1/FVC: 67%\par [id number]%: 0.77 L/s (31%)\par DLCO: 18.1 (83%)\par 6MWT: 427 meters, SpO2 start: 98% --> SpO2 end: 98%\par Mild expiratory airflow obstruction. Normal diffusion capacity. NL walk distance with no signifcant desaturation.\par \par Sputum culture results:\par AFB 18: negative\par C&S 18: NL estella x 2\par AFB 18: pending, no growth at 1 week\par C&S 18: NL estella\par \par [place] 18\par FVC: 2.42 82%\par FEV1: 1.59 66%\par FEV1/FVC: 66%\par \par [place] 3/29/18:\par FVC: 2.58 L (73%)\par FEV1: 1.85 L (6%)\par FEV1/FVC: 90%\par [id number]%: 1.20 L/s (46%)\par Mild obstructive defect. \par \par Sputum AFB 18: no growth at 6 weeks\par Sputum C&S 18: numerous mucin producing Pseudomonas aeruginosa\par Sputum C&S 18: numerous mucin producing Pseudomonas aeruginosa\par Sputa AFB x 3 from 2018: pending\par \par 1-30-18\par FVC: 3.07L (87%) --> 3.09L (87%)\par FEV1: 2.11L (76%) --> 2.17L (78%)\par FEV1/FVC: 69%\par \par T.02L (98%)\par DLCO: 103%\par \par 6MWT: 576meters. HR/Sp02. 89bpm/98% --> 116bpm/96% \par yeimi scale 2

## 2019-12-05 NOTE — PHYSICAL EXAM
[General Appearance - Well Developed] : well developed [Normal Appearance] : normal appearance [General Appearance - Well Nourished] : well nourished [Well Groomed] : well groomed [No Deformities] : no deformities [General Appearance - In No Acute Distress] : no acute distress [Normal Conjunctiva] : the conjunctiva exhibited no abnormalities [I] : I [Neck Appearance] : the appearance of the neck was normal [Jugular Venous Distention Increased] : there was no jugular-venous distention [Heart Sounds] : normal S1 and S2 [Heart Rate And Rhythm] : heart rate and rhythm were normal [Murmurs] : no murmurs present [] : no respiratory distress [Edema] : no peripheral edema present [Respiration, Rhythm And Depth] : normal respiratory rhythm and effort [Exaggerated Use Of Accessory Muscles For Inspiration] : no accessory muscle use [Abdomen Soft] : soft [Abdomen Tenderness] : non-tender [Gait - Sufficient For Exercise Testing] : the gait was sufficient for exercise testing [Abnormal Walk] : normal gait [Cyanosis, Localized] : no localized cyanosis [Skin Color & Pigmentation] : normal skin color and pigmentation [No Focal Deficits] : no focal deficits [Oriented To Time, Place, And Person] : oriented to person, place, and time [Impaired Insight] : insight and judgment were intact [Affect] : the affect was normal [Memory Recent] : recent memory was not impaired [FreeTextEntry2] : no pedal edema  [FreeTextEntry1] : no cyanosis

## 2019-12-29 ENCOUNTER — FORM ENCOUNTER (OUTPATIENT)
Age: 59
End: 2019-12-29

## 2019-12-30 ENCOUNTER — APPOINTMENT (OUTPATIENT)
Dept: CT IMAGING | Facility: HOSPITAL | Age: 59
End: 2019-12-30
Payer: COMMERCIAL

## 2019-12-30 ENCOUNTER — OUTPATIENT (OUTPATIENT)
Dept: OUTPATIENT SERVICES | Facility: HOSPITAL | Age: 59
LOS: 1 days | End: 2019-12-30
Payer: COMMERCIAL

## 2019-12-30 ENCOUNTER — RESULT REVIEW (OUTPATIENT)
Age: 59
End: 2019-12-30

## 2019-12-30 PROCEDURE — 71250 CT THORAX DX C-: CPT | Mod: 26

## 2019-12-30 PROCEDURE — 71250 CT THORAX DX C-: CPT

## 2020-01-29 LAB — FUNGUS SPT CULT: NORMAL

## 2020-02-19 LAB — ACID FAST STN SPT: NORMAL

## 2020-03-18 NOTE — PATIENT PROFILE ADULT. - FUNCTIONAL SCREEN CURRENT LEVEL: COMMUNICATION, MLM
Latex:  Patient denies allergy to latex.  Medications reviewed with patient.  Tobacco use verified.  Allergies verified.    Primary Medical Doctor: No Pcp   DATE OF INJURY/SURGERY:    Chief Complaint   Patient presents with   • Wrist     left wrist FX DOI 2/12/20           Patient is here for left wrist follow up.  He is having pain and swelling in his wrist.  He is taking Ibuprofen or Aleve for pain.   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     (0) understands/communicates without difficulty

## 2020-04-29 ENCOUNTER — TRANSCRIPTION ENCOUNTER (OUTPATIENT)
Age: 60
End: 2020-04-29

## 2020-04-29 DIAGNOSIS — J30.2 OTHER SEASONAL ALLERGIC RHINITIS: ICD-10-CM

## 2020-06-02 ENCOUNTER — APPOINTMENT (OUTPATIENT)
Dept: PULMONOLOGY | Facility: CLINIC | Age: 60
End: 2020-06-02

## 2021-01-06 ENCOUNTER — TRANSCRIPTION ENCOUNTER (OUTPATIENT)
Age: 61
End: 2021-01-06

## 2021-01-26 ENCOUNTER — APPOINTMENT (OUTPATIENT)
Dept: PULMONOLOGY | Facility: CLINIC | Age: 61
End: 2021-01-26
Payer: COMMERCIAL

## 2021-01-26 VITALS
RESPIRATION RATE: 12 BRPM | OXYGEN SATURATION: 99 % | SYSTOLIC BLOOD PRESSURE: 136 MMHG | DIASTOLIC BLOOD PRESSURE: 78 MMHG | HEART RATE: 76 BPM | TEMPERATURE: 98 F | HEIGHT: 66 IN | BODY MASS INDEX: 18.96 KG/M2 | WEIGHT: 118 LBS

## 2021-01-26 PROCEDURE — 99072 ADDL SUPL MATRL&STAF TM PHE: CPT

## 2021-01-26 PROCEDURE — 99215 OFFICE O/P EST HI 40 MIN: CPT

## 2021-01-26 NOTE — HISTORY OF PRESENT ILLNESS
[TextBox_4] : 61 y/o woman. Never smoker. \par \par Initial intake 1/30/18: Reports multiple episodes of pneumonia since 2000. 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 Initially treated with 2 drugs - ethambutol and erythromycin (daily dosing). Unclear why 2 drug were used. After 13 months later her phlegm was still growing MAC and she had no improvement in her symptoms of cough and night sweats. No hemoptysis. She reports good compliance with her abx regimen. She was then started on rifampin to her 2 drug regimen. She took it for about 1 month but did not tolerate it (confusion, forgetfulness) No GI symptoms. She reports not being on medications for MAC now for the last 12 months (profuse sweating at night and sometimes in the day, weight loss is minimal - 5lbs). Sister has bronchiectasis. She has NGTD in recent sputum cultures and negative AFB from 7/6 and 7/24. Was started on azithromycin TIW, hypertonic saline nebs, IS with aerobika, and VEST device was recommended. \par \par 1-26-21:\par she denies shortness of breath, denies cough\par Using Aerobika once per day and nebulized saline 7%. Also uses vest. \par She is walking as much as she can, 30 minutes per day\par No worsening cough. 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

## 2021-01-26 NOTE — PHYSICAL EXAM
[No Acute Distress] : no acute distress [Normal Oropharynx] : normal oropharynx [Normal Appearance] : normal appearance [No Neck Mass] : no neck mass [Normal S1, S2] : normal s1, s2 [No Murmurs] : no murmurs [No Resp Distress] : no resp distress [No Abnormalities] : no abnormalities [Benign] : benign [Normal Gait] : normal gait [No Clubbing] : no clubbing [No Cyanosis] : no cyanosis [No Edema] : no edema [FROM] : FROM [Normal Color/ Pigmentation] : normal color/ pigmentation [No Focal Deficits] : no focal deficits [Oriented x3] : oriented x3 [Normal Affect] : normal affect [No HSM] : no hsm [TextBox_11] : Mild pallor, no icterus [TextBox_44] : no cervical adenopathy, no cervical adenopathy, no JVD [TextBox_54] : Mildly tachycardic [TextBox_68] : no dullness, pulmonary squeeks audible in the infrascapular regions on both sides, few inspiratory crackles noted bilaterally, mid expiratory wheeze noted in the infra scapular area [TextBox_99] : g [TextBox_105] : good peripheral pulses

## 2021-01-26 NOTE — ASSESSMENT
[FreeTextEntry1] : 1-26-21:\par It was a pleasure to see Pam for follow-up today. Her respiratory issues are:\par \par 1. Bronchiectasis\par On exam today, the crackling sounds have come back to her baseline and this correlates with how she is feeling. She is continuing to use airway clearance devices, including Aerobika, hypertonic saline via nebulizer, and vest. We have advised her to try to use these as prescribed as much as possible. We have sent off multiple sputum cultures on her, and they have all been unremarkable, including the last two sputum samples from October 2019. We will continue to periodically send sputum for culture. Spirometry today demonstrates a mild obstructive defect.\par \par The pattern of bronchiectasis  in a middle aged women affecting the RML and lingular segments suggests CHALINO, however we have not isolated CHALINO on any AFB cultures thus far. We have requested Pam to collect sputum at home and bring it back for AFB culture. Should her symptoms becomes worse or she develop bronchiectasis exacerbations or pneumonias requiring antibiotics, then we would recommend a bronchoscopy and BAL to look for CHALINO.  \par \par Plan:\par - We asked Pam to send sputum cultures for bacteria and AFB.\par \par 2. Pulmonary nodule\par On Pam's CT chest from 6/21/19, in addition to cystic bronchiectasis, there was also evidence of a RUL nodule above the fissure this showed and area of focal hyperlucency suggestive of cavitation. However, this may also represent an airway in the nodule and may not be a true cavity. She is not toxic and infection is unlikely. Repeat chest CT done 12/3/19 shows there has been improvement in the areas of nodularity in the right upper lobe, including the reported 9 mm nodular opacity. This is consistent with improvement in infectious small airways disease. We will hold off repeating CT at this time and will re-evaluate in 6 months\par \par 3. Health maintenance\par Continue walking on a daily basis. She is up to date with her influenza and pneumococcal vaccinations. She received the first dose of the COVID vaccine.\par \par Return to clinic: 6 months

## 2021-01-26 NOTE — PROCEDURE
[FreeTextEntry1] : Chest CT 19\par Impression: \par 1. Since 2019, there has been improvement in the areas of nodularity in the right upper lobe, \par including the reported 9 mm nodular opacity. This is consistent with improvement in infectious small \par airways disease. \par 2. There is still evidence of advanced large and small airways disease, likely due to CHALINO.\par \par Spirometry 12/3/19:\par FVC: 2.71 L (75%)\par FEV1: 1.85 (66%)\par FEV1/FVC: 68%\par [id number]%: 0.99 L/s (38%)\par Mild obstructive defect. Mildly reduced FVC. \par \par EXAM: XR CHEST PA LAT 2V PROCEDURE DATE: 10/01/2019 \par Scattered increased lung markings. Increasing bronchiectasis/infiltrate left lower lung/lingula compared to prior chest x-ray 2018. No pleural effusion. No pneumothorax. Unremarkable cardiac silhouette. No acute bone abnormality. \par \par Spirometry 10/1/19:\par FVC: 2.60 L (84%)\par FEV1: 1.89 L (75%)\par FEV1/FVC: 73%\par [id number]%:  1.30 L/s (48%)\par FVC normal. Mild airflow obstruction.\par \par EXAM: CT CHEST PROCEDURE DATE: 2019 \par There is a new 9 mm subpleural nodule within the right upper lobe (series 4, image 102) with adjacent tubular branching opacities noted inferiorly, which are slightly progressed as compared to previous examination. Additional areas of scattered tree-in-bud nodularity demonstrate stable to slightly \par increased prominence, including increased nodularity within the superior segment of the left lower lobe (series 4, image 21). Areas of bronchiectasis within the lingula and right middle lobe are not significantly changed. No effusion, consolidation or pneumothorax. \par The central airways are patent and normal. \par The heart size and pulmonary vascularity are normal. \par No significant hilar or mediastinal adenopathy is present. \par Images of the upper abdomen are unremarkable. The osseous structures are unchanged including scattered bone islands and degenerative changes. \par Impression: \par There is sequelae of small airways disease with or without infectious/inflammatory component. Specifically, there is stable to slightly increased prominence of scattered tree-in-bud nodules as well as unchanged moderate right middle lobe and lingular bronchiectasis, for which a background of CHALINO cannot be excluded, among other potential etiologies. \par There is a new 9 mm right upper lobe nodule likely in keeping with sequelae of mucus impaction/small airways disease. However, follow-up within 3-6 months is recommended to assess for stability. \par \par Spirometry 19:\par FVC: 2.88 L (91%)\par FEV1: 2.10 L (82%)\par FEV1/FVC: 73%\par [id number]%: 1.40 L/s (50%)\par DLCO:  19.6 (109%)\par Normal spirometry. No significant obstruction. Normal diffusion capacity. \par \par Sputum cultures: 18 and 18 - AFB negative;  and  C&S NL estella\par \par [place], DLCO, 6MWT 18:\par FVC: 2.48 L (74%(\par FEV1: 1.65 L (64%)\par FEV1/FVC: 67%\par [id number]%: 0.77 L/s (31%)\par DLCO: 18.1 (83%)\par 6MWT: 427 meters, SpO2 start: 98% --> SpO2 end: 98%\par Mild expiratory airflow obstruction. Normal diffusion capacity. NL walk distance with no signifcant desaturation.\par \par Sputum culture results:\par AFB 18: negative\par C&S 18: NL estella x 2\par AFB 18: pending, no growth at 1 week\par C&S 18: NL estella\par \par [place] 18\par FVC: 2.42 82%\par FEV1: 1.59 66%\par FEV1/FVC: 66%\par \par [place] 3/29/18:\par FVC: 2.58 L (73%)\par FEV1: 1.85 L (6%)\par FEV1/FVC: 90%\par [id number]%: 1.20 L/s (46%)\par Mild obstructive defect. \par \par Sputum AFB 18: no growth at 6 weeks\par Sputum C&S 18: numerous mucin producing Pseudomonas aeruginosa\par Sputum C&S 18: numerous mucin producing Pseudomonas aeruginosa\par Sputa AFB x 3 from 2018: pending\par \par 18\par FVC: 3.07L (87%) --> 3.09L (87%)\par FEV1: 2.11L (76%) --> 2.17L (78%)\par FEV1/FVC: 69%\par \par T.02L (98%)\par DLCO: 103%\par \par 6MWT: 576meters. HR/Sp02. 89bpm/98% --> 116bpm/96% \par yeimi scale 2

## 2021-01-26 NOTE — CONSULT LETTER
[Dear  ___] : Dear ~MARISABEL, [Consult Letter:] : I had the pleasure of evaluating your patient, [unfilled]. [Please see my note below.] : Please see my note below. [Consult Closing:] : Thank you very much for allowing me to participate in the care of this patient.  If you have any questions, please do not hesitate to contact me. [Sincerely,] : Sincerely, [DrYasmani  ___] : Dr. ASENCIO [FreeTextEntry2] : Erica Snyder MD, \[place], [place] [FreeTextEntry3] : Shawn Guillen MD

## 2021-06-25 ENCOUNTER — TRANSCRIPTION ENCOUNTER (OUTPATIENT)
Age: 61
End: 2021-06-25

## 2021-06-26 ENCOUNTER — LABORATORY RESULT (OUTPATIENT)
Age: 61
End: 2021-06-26

## 2021-06-29 ENCOUNTER — NON-APPOINTMENT (OUTPATIENT)
Age: 61
End: 2021-06-29

## 2021-06-29 LAB — BACTERIA SPT CULT: NORMAL

## 2021-07-24 PROBLEM — A49.8 PSEUDOMONAS INFECTION: Status: ACTIVE | Noted: 2018-03-22

## 2021-07-27 ENCOUNTER — APPOINTMENT (OUTPATIENT)
Dept: PULMONOLOGY | Facility: CLINIC | Age: 61
End: 2021-07-27
Payer: COMMERCIAL

## 2021-07-27 VITALS
DIASTOLIC BLOOD PRESSURE: 76 MMHG | OXYGEN SATURATION: 99 % | TEMPERATURE: 97.6 F | HEART RATE: 91 BPM | HEIGHT: 66 IN | SYSTOLIC BLOOD PRESSURE: 133 MMHG | BODY MASS INDEX: 18.32 KG/M2 | WEIGHT: 114 LBS

## 2021-07-27 DIAGNOSIS — A49.8 OTHER BACTERIAL INFECTIONS OF UNSPECIFIED SITE: ICD-10-CM

## 2021-07-27 PROCEDURE — 99214 OFFICE O/P EST MOD 30 MIN: CPT

## 2021-07-27 NOTE — CONSULT LETTER
[Dear  ___] : Dear ~MARISABEL, [Consult Letter:] : I had the pleasure of evaluating your patient, [unfilled]. [Please see my note below.] : Please see my note below. [Consult Closing:] : Thank you very much for allowing me to participate in the care of this patient.  If you have any questions, please do not hesitate to contact me. [Sincerely,] : Sincerely, [DrYasmani  ___] : Dr. ASENCIO [FreeTextEntry2] : Erica Snyder MD, \[place], [place] [FreeTextEntry3] : Shawn Guillen MD

## 2021-07-27 NOTE — DISCUSSION/SUMMARY
[FreeTextEntry1] : Medical Attending's Pertinent Exam:\par 7-27-21:\par -no pallor, no icterus\par -few shotty lymph nodes in the lower anterior cervical chain on the left, no JVD at 45 degrees \par -no dullness, adequate air entry, moist crackles both sides predominantly posteriorly \par - normal S1, S2, no murmurs, rubs, gallops \par -no cyanosis, no clubbing,no skin thickening on the dorsum of the hands, no palmar erythema, good radial pulses\par -trace pitting edema

## 2021-07-27 NOTE — REVIEW OF SYSTEMS
[Negative] : Endocrine [Recent Wt Loss (___ Lbs)] : ~T recent [unfilled] lb weight loss [Cough] : cough [Sputum] : sputum [Dyspnea] : no dyspnea [SOB on Exertion] : no sob on exertion [GERD] : no gerd

## 2021-07-27 NOTE — PROCEDURE
[FreeTextEntry1] : Chest CT 19\par Impression: \par 1. Since 2019, there has been improvement in the areas of nodularity in the right upper lobe, \par including the reported 9 mm nodular opacity. This is consistent with improvement in infectious small \par airways disease. \par 2. 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 Mildly reduced FVC. \par \par EXAM: XR CHEST PA LAT 2V PROCEDURE DATE: 10/01/2019 \par Scattered increased lung markings. Increasing bronchiectasis/infiltrate left lower lung/lingula compared to prior chest x-ray 2018. No pleural effusion. No pneumothorax. Unremarkable cardiac silhouette. No acute bone abnormality. \par \par Spirometry 10/1/19:\par FVC: 2.60 L (84%)\par FEV1: 1.89 L (75%)\par FEV1/FVC: 73%\par NYC32-68%:  1.30 L/s (48%)\par FVC normal. Mild airflow obstruction.\par \par EXAM: CT CHEST PROCEDURE DATE: 2019 \par There is a new 9 mm subpleural nodule within the right upper lobe (series 4, image 102) with adjacent tubular branching opacities noted inferiorly, which are slightly progressed as compared to previous examination. Additional areas of scattered tree-in-bud nodularity demonstrate stable to slightly \par increased prominence, including increased nodularity within the superior segment of the left lower lobe (series 4, image 21). Areas of bronchiectasis within the lingula and right middle lobe are not significantly changed. No effusion, consolidation or pneumothorax. \par The central airways are patent and normal. \par The heart size and pulmonary vascularity are normal. \par No significant hilar or mediastinal adenopathy is present. \par Images of the upper abdomen are unremarkable. The osseous structures are unchanged including scattered bone islands and degenerative changes. \par Impression: \par There is sequelae of small airways disease with or without infectious/inflammatory component. Specifically, there is stable to slightly increased prominence of scattered tree-in-bud nodules as well as unchanged moderate right middle lobe and lingular bronchiectasis, for which a background of CHALINO cannot be excluded, among other potential etiologies. \par There is a new 9 mm right upper lobe nodule likely in keeping with sequelae of mucus impaction/small airways disease. However, follow-up within 3-6 months is recommended to assess for stability. \par \par Spirometry 19:\par FVC: 2.88 L (91%)\par FEV1: 2.10 L (82%)\par FEV1/FVC: 73%\par GXP56-59%: 1.40 L/s (50%)\par DLCO:  19.6 (109%)\par Normal spirometry. No significant obstruction. Normal diffusion capacity. \par \par Sputum cultures: 18 and 18 - AFB negative;  and  C&S NL estella\par \par [place], DLCO, 6MWT 18:\par FVC: 2.48 L (74%(\par FEV1: 1.65 L (64%)\par FEV1/FVC: 67%\par [id number]%: 0.77 L/s (31%)\par DLCO: 18.1 (83%)\par 6MWT: 427 meters, SpO2 start: 98% --> SpO2 end: 98%\par Mild expiratory airflow obstruction. Normal diffusion capacity. NL walk distance with no signifcant desaturation.\par \par Sputum culture results:\par AFB 18: negative\par C&S 18: NL estella x 2\par AFB 18: pending, no growth at 1 week\par C&S 18: NL estella\par \par [place] 18\par FVC: 2.42 82%\par FEV1: 1.59 66%\par FEV1/FVC: 66%\par \par [place] 3/29/18:\par FVC: 2.58 L (73%)\par FEV1: 1.85 L (6%)\par FEV1/FVC: 90%\par GPN14-26%: 1.20 L/s (46%)\par Mild obstructive defect. \par \par Sputum AFB 18: no growth at 6 weeks\par Sputum C&S 18: numerous mucin producing Pseudomonas aeruginosa\par Sputum C&S 18: numerous mucin producing Pseudomonas aeruginosa\par Sputa AFB x 3 from 2018: pending\par \par 18\par FVC: 3.07L (87%) --> 3.09L (87%)\par FEV1: 2.11L (76%) --> 2.17L (78%)\par FEV1/FVC: 69%\par \par T.02L (98%)\par DLCO: 103%\par \par 6MWT: 576meters. HR/Sp02. 89bpm/98% --> 116bpm/96% \par yeimi scale 2

## 2021-07-27 NOTE — HISTORY OF PRESENT ILLNESS
[TextBox_4] : 61 y/o woman. Never smoker. \par \par Initial intake 1/30/18: Reports multiple episodes of pneumonia since 2000. 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 Initially treated with 2 drugs - ethambutol and erythromycin (daily dosing). Unclear why 2 drug were used. After 13 months later her phlegm was still growing MAC and she had no improvement in her symptoms of cough and night sweats. No hemoptysis. She reports good compliance with her abx regimen. She was then started on rifampin to her 2 drug regimen. She took it for about 1 month but did not tolerate it (confusion, forgetfulness) No GI symptoms. She reports not being on medications for MAC now for the last 12 months (profuse sweating at night and sometimes in the day, weight loss is minimal - 5lbs). Sister has bronchiectasis. She has NGTD in recent sputum cultures and negative AFB from 7/6 and 7/24. Was started on azithromycin TIW, hypertonic saline nebs, IS with aerobika, and VEST device was recommended. \par \par 7-27-21:\par She is coughing a little more for a few weeks, bringing up some phlegm in the evening. She feels like she has to clear her throat\par She denies heartburn\par she denies shortness of breath\par She has lost 4 pounds due to stress\par Using Aerobika once per day and nebulized saline 7%. Also uses vest. \par She is walking as much as she can, 30 minutes per day\par No fevers.\par she received both doses of COVID vaccine

## 2021-07-27 NOTE — PHYSICAL EXAM
[No Acute Distress] : no acute distress [Normal Oropharynx] : normal oropharynx [Normal Appearance] : normal appearance [No Neck Mass] : no neck mass [No Resp Distress] : no resp distress [No Abnormalities] : no abnormalities [Benign] : benign [No HSM] : no hsm [Normal Gait] : normal gait [No Clubbing] : no clubbing [No Cyanosis] : no cyanosis [FROM] : FROM [Normal Color/ Pigmentation] : normal color/ pigmentation [No Focal Deficits] : no focal deficits [Oriented x3] : oriented x3 [Normal Affect] : normal affect [TextBox_44] : few shotty lymph nodes in the lower anterior cervical chain on the left, no JVD at 45 degrees [TextBox_11] : no pallor, no icterus [TextBox_54] : normal S1, S2, no murmurs, rubs, gallops [TextBox_68] : no dullness, adequate air entry, moist crackles both sides predominantly posteriorly [TextBox_105] : no skin thickening on the dorsum of the hands, no palmar erythema, good radial pulses, trace pitting edema

## 2021-07-27 NOTE — ASSESSMENT
[FreeTextEntry1] : 7-27-21:\par It was a pleasure to see Pam for follow-up today. Her respiratory issues are:\par \par 1. Bronchiectasis\par Pam in the past has been on triple therapy (macrolide, ethambutol). She did not tolerate Rifampin due to confusion. She is continuing to use airway clearance devices, including Aerobika, hypertonic saline 7% via nebulizer, and vest once a day. We have advised her to try to use these as prescribed as much as possible. We have sent off multiple sputum cultures on her, and they have all been unremarkable. The most recent bacterial culture from 6/26/21 is negative; However, the AFB culture shows growth of AFB detected in broth. The organism has not been identified yet. We will continue to send serial sputum cultures. Spirometry 2019 demonstrates a mild obstructive defect.\par \par The pattern of bronchiectasis in a middle aged women affecting the RML and lingular segments suggests CHALINO. The most recent AFB sputum from 6/26/21 will likely come back as CHALINO. We discussed indications to treat Pam for CHALINO. It would be a combination of 3 antibiotics for at least 1 year after the sputum clears the CHALINO. She has a cough, but denies shortness of breath, fevers, night sweats. She has lost 4 pounds since last visit, but attributes this to stress. \par \par Plan:\par - We will hold off starting antibiotics at this time\par - Once the AFB organism is identified, we will request for it to be sent to [facility] for susceptibility testing\par - We asked Pam to send sputum cultures for bacteria and AFB.\par - She should increase airway clearance to twice a day\par - We will order a low dose chest CT w/o contrast to re-evaluate the bronchiectasis\par - We will order repeat PFT, 6MWT\par - We will order CBC, CMP and CRP today\par \par 2. Pulmonary nodule\par On Pam's CT chest from 6/21/19, in addition to cystic bronchiectasis, there was also evidence of a RUL nodule above the fissure this showed and area of focal hyperlucency suggestive of cavitation. However, this may also represent an airway in the nodule and may not be a true cavity. She is not toxic and infection is unlikely. Repeat chest CT done 12/3/19 shows there has been improvement in the areas of nodularity in the right upper lobe, including the reported 9 mm nodular opacity. This is consistent with improvement in infectious small airways disease. Given her recent positive AFB sputum and increased cough, we have ordered a follow up chest CT. \par \par 3. Health maintenance\par Continue walking on a daily basis. She is up to date with her influenza and pneumococcal vaccinations. She received both doses of the COVID vaccine.\par \par Return to clinic: 3 months\par Once the non-CF bronchiectasis center is established, we will have her see Dr. Michael De La O at her next visit

## 2021-07-28 ENCOUNTER — NON-APPOINTMENT (OUTPATIENT)
Age: 61
End: 2021-07-28

## 2021-07-28 LAB
ALBUMIN SERPL ELPH-MCNC: 4.7 G/DL
ALP BLD-CCNC: 76 U/L
ALT SERPL-CCNC: 7 U/L
ANION GAP SERPL CALC-SCNC: 14 MMOL/L
AST SERPL-CCNC: 16 U/L
BASOPHILS # BLD AUTO: 0.06 K/UL
BASOPHILS NFR BLD AUTO: 0.7 %
BILIRUB SERPL-MCNC: 0.5 MG/DL
BUN SERPL-MCNC: 10 MG/DL
CALCIUM SERPL-MCNC: 9.7 MG/DL
CHLORIDE SERPL-SCNC: 104 MMOL/L
CO2 SERPL-SCNC: 22 MMOL/L
CREAT SERPL-MCNC: 0.83 MG/DL
CRP SERPL-MCNC: 4 MG/L
EOSINOPHIL # BLD AUTO: 0.12 K/UL
EOSINOPHIL NFR BLD AUTO: 1.3 %
GLUCOSE SERPL-MCNC: 91 MG/DL
HCT VFR BLD CALC: 41.8 %
HGB BLD-MCNC: 13.6 G/DL
IMM GRANULOCYTES NFR BLD AUTO: 0.2 %
LYMPHOCYTES # BLD AUTO: 1.34 K/UL
LYMPHOCYTES NFR BLD AUTO: 15.1 %
MAN DIFF?: NORMAL
MCHC RBC-ENTMCNC: 30.4 PG
MCHC RBC-ENTMCNC: 32.5 GM/DL
MCV RBC AUTO: 93.3 FL
MONOCYTES # BLD AUTO: 0.53 K/UL
MONOCYTES NFR BLD AUTO: 6 %
NEUTROPHILS # BLD AUTO: 6.83 K/UL
NEUTROPHILS NFR BLD AUTO: 76.7 %
PLATELET # BLD AUTO: 287 K/UL
POTASSIUM SERPL-SCNC: 4.3 MMOL/L
PROT SERPL-MCNC: 7.5 G/DL
RBC # BLD: 4.48 M/UL
RBC # FLD: 12.3 %
SODIUM SERPL-SCNC: 140 MMOL/L
WBC # FLD AUTO: 8.9 K/UL

## 2021-07-29 ENCOUNTER — NON-APPOINTMENT (OUTPATIENT)
Age: 61
End: 2021-07-29

## 2021-07-30 ENCOUNTER — NON-APPOINTMENT (OUTPATIENT)
Age: 61
End: 2021-07-30

## 2021-07-30 LAB — BACTERIA SPT CULT: NORMAL

## 2021-08-02 ENCOUNTER — NON-APPOINTMENT (OUTPATIENT)
Age: 61
End: 2021-08-02

## 2021-08-04 ENCOUNTER — APPOINTMENT (OUTPATIENT)
Dept: PULMONOLOGY | Facility: CLINIC | Age: 61
End: 2021-08-04
Payer: COMMERCIAL

## 2021-08-04 ENCOUNTER — NON-APPOINTMENT (OUTPATIENT)
Age: 61
End: 2021-08-04

## 2021-08-04 ENCOUNTER — LABORATORY RESULT (OUTPATIENT)
Age: 61
End: 2021-08-04

## 2021-08-04 LAB
BASOPHILS # BLD AUTO: 0.08 K/UL
BASOPHILS NFR BLD AUTO: 1.3 %
EOSINOPHIL # BLD AUTO: 0.2 K/UL
EOSINOPHIL NFR BLD AUTO: 3.4 %
HCT VFR BLD CALC: 39.2 %
HGB BLD-MCNC: 13 G/DL
IMM GRANULOCYTES NFR BLD AUTO: 0.2 %
LYMPHOCYTES # BLD AUTO: 1.23 K/UL
LYMPHOCYTES NFR BLD AUTO: 20.6 %
MAN DIFF?: NORMAL
MCHC RBC-ENTMCNC: 30.5 PG
MCHC RBC-ENTMCNC: 33.2 GM/DL
MCV RBC AUTO: 92 FL
MONOCYTES # BLD AUTO: 0.51 K/UL
MONOCYTES NFR BLD AUTO: 8.5 %
NEUTROPHILS # BLD AUTO: 3.94 K/UL
NEUTROPHILS NFR BLD AUTO: 66 %
PLATELET # BLD AUTO: 250 K/UL
RBC # BLD: 4.26 M/UL
RBC # FLD: 12.2 %
WBC # FLD AUTO: 5.97 K/UL

## 2021-08-04 PROCEDURE — ZZZZZ: CPT

## 2021-08-04 PROCEDURE — 94060 EVALUATION OF WHEEZING: CPT

## 2021-08-04 PROCEDURE — 94727 GAS DIL/WSHOT DETER LNG VOL: CPT

## 2021-08-04 PROCEDURE — 94618 PULMONARY STRESS TESTING: CPT

## 2021-08-04 PROCEDURE — 95012 NITRIC OXIDE EXP GAS DETER: CPT

## 2021-08-04 PROCEDURE — 94729 DIFFUSING CAPACITY: CPT

## 2021-08-05 ENCOUNTER — NON-APPOINTMENT (OUTPATIENT)
Age: 61
End: 2021-08-05

## 2021-08-05 LAB
A FUMIGATUS IGE QN: <0.1 KUA/L
A NIGER IGE QN: <0.1 KUA/L
DEPRECATED A FUMIGATUS IGE RAST QL: 0
DEPRECATED A NIGER IGE RAST QL: 0
TOTAL IGE SMQN RAST: 28 KU/L

## 2021-08-06 ENCOUNTER — NON-APPOINTMENT (OUTPATIENT)
Age: 61
End: 2021-08-06

## 2021-08-06 LAB — FUNGITELL QUANTITATIVE VALUE: <31 PG/ML

## 2021-08-09 ENCOUNTER — NON-APPOINTMENT (OUTPATIENT)
Age: 61
End: 2021-08-09

## 2021-08-09 LAB
A FLAVUS AB FLD QL: NEGATIVE
A FUMIGATUS AB FLD QL: NEGATIVE
A NIGER AB FLD QL: NEGATIVE
GALACTOMANNAN AG SERPL QL IA: 0.03 INDEX

## 2021-08-10 ENCOUNTER — NON-APPOINTMENT (OUTPATIENT)
Age: 61
End: 2021-08-10

## 2021-08-11 ENCOUNTER — NON-APPOINTMENT (OUTPATIENT)
Age: 61
End: 2021-08-11

## 2021-08-12 ENCOUNTER — APPOINTMENT (OUTPATIENT)
Dept: CT IMAGING | Facility: HOSPITAL | Age: 61
End: 2021-08-12
Payer: COMMERCIAL

## 2021-08-12 ENCOUNTER — OUTPATIENT (OUTPATIENT)
Dept: OUTPATIENT SERVICES | Facility: HOSPITAL | Age: 61
LOS: 1 days | End: 2021-08-12
Payer: COMMERCIAL

## 2021-08-12 ENCOUNTER — NON-APPOINTMENT (OUTPATIENT)
Age: 61
End: 2021-08-12

## 2021-08-12 PROCEDURE — 71250 CT THORAX DX C-: CPT

## 2021-08-12 PROCEDURE — 71250 CT THORAX DX C-: CPT | Mod: 26

## 2021-08-13 ENCOUNTER — NON-APPOINTMENT (OUTPATIENT)
Age: 61
End: 2021-08-13

## 2021-08-17 ENCOUNTER — NON-APPOINTMENT (OUTPATIENT)
Age: 61
End: 2021-08-17

## 2021-08-27 ENCOUNTER — NON-APPOINTMENT (OUTPATIENT)
Age: 61
End: 2021-08-27

## 2021-08-27 LAB — FUNGUS SPT CULT: ABNORMAL

## 2021-09-15 ENCOUNTER — NON-APPOINTMENT (OUTPATIENT)
Age: 61
End: 2021-09-15

## 2021-09-15 LAB — ACID FAST STN SPT: ABNORMAL

## 2021-10-06 ENCOUNTER — NON-APPOINTMENT (OUTPATIENT)
Age: 61
End: 2021-10-06

## 2021-10-06 LAB — ACID FAST STN SPT: ABNORMAL

## 2021-11-01 ENCOUNTER — EMERGENCY (EMERGENCY)
Facility: HOSPITAL | Age: 61
LOS: 1 days | Discharge: HOME | End: 2021-11-01
Attending: EMERGENCY MEDICINE
Payer: COMMERCIAL

## 2021-11-01 VITALS
TEMPERATURE: 98 F | RESPIRATION RATE: 18 BRPM | SYSTOLIC BLOOD PRESSURE: 141 MMHG | WEIGHT: 110.67 LBS | OXYGEN SATURATION: 98 % | DIASTOLIC BLOOD PRESSURE: 80 MMHG | HEART RATE: 70 BPM

## 2021-11-01 LAB
ACETONE SERPL-MCNC: NEGATIVE — SIGNIFICANT CHANGE UP
ALBUMIN SERPL ELPH-MCNC: 3.3 G/DL — LOW (ref 3.5–5)
ALP SERPL-CCNC: 62 U/L — SIGNIFICANT CHANGE UP (ref 40–120)
ALT FLD-CCNC: 17 U/L DA — SIGNIFICANT CHANGE UP (ref 10–60)
ANION GAP SERPL CALC-SCNC: 8 MMOL/L — SIGNIFICANT CHANGE UP (ref 5–17)
AST SERPL-CCNC: 18 U/L — SIGNIFICANT CHANGE UP (ref 10–40)
BASOPHILS # BLD AUTO: 0.02 K/UL — SIGNIFICANT CHANGE UP (ref 0–0.2)
BASOPHILS NFR BLD AUTO: 0.4 % — SIGNIFICANT CHANGE UP (ref 0–2)
BILIRUB SERPL-MCNC: 0.4 MG/DL — SIGNIFICANT CHANGE UP (ref 0.2–1.2)
BUN SERPL-MCNC: 9 MG/DL — SIGNIFICANT CHANGE UP (ref 7–18)
CALCIUM SERPL-MCNC: 8.7 MG/DL — SIGNIFICANT CHANGE UP (ref 8.4–10.5)
CHLORIDE SERPL-SCNC: 95 MMOL/L — LOW (ref 96–108)
CO2 SERPL-SCNC: 25 MMOL/L — SIGNIFICANT CHANGE UP (ref 22–31)
CREAT SERPL-MCNC: 0.65 MG/DL — SIGNIFICANT CHANGE UP (ref 0.5–1.3)
EOSINOPHIL # BLD AUTO: 0.11 K/UL — SIGNIFICANT CHANGE UP (ref 0–0.5)
EOSINOPHIL NFR BLD AUTO: 2.3 % — SIGNIFICANT CHANGE UP (ref 0–6)
GLUCOSE SERPL-MCNC: 101 MG/DL — HIGH (ref 70–99)
HCT VFR BLD CALC: 37.3 % — SIGNIFICANT CHANGE UP (ref 34.5–45)
HGB BLD-MCNC: 12.9 G/DL — SIGNIFICANT CHANGE UP (ref 11.5–15.5)
HMPV RNA SPEC QL NAA+PROBE: DETECTED
IMM GRANULOCYTES NFR BLD AUTO: 0.2 % — SIGNIFICANT CHANGE UP (ref 0–1.5)
LIDOCAIN IGE QN: 51 U/L — LOW (ref 73–393)
LYMPHOCYTES # BLD AUTO: 0.99 K/UL — LOW (ref 1–3.3)
LYMPHOCYTES # BLD AUTO: 21 % — SIGNIFICANT CHANGE UP (ref 13–44)
MAGNESIUM SERPL-MCNC: 2 MG/DL — SIGNIFICANT CHANGE UP (ref 1.6–2.6)
MCHC RBC-ENTMCNC: 30.5 PG — SIGNIFICANT CHANGE UP (ref 27–34)
MCHC RBC-ENTMCNC: 34.6 GM/DL — SIGNIFICANT CHANGE UP (ref 32–36)
MCV RBC AUTO: 88.2 FL — SIGNIFICANT CHANGE UP (ref 80–100)
MONOCYTES # BLD AUTO: 0.48 K/UL — SIGNIFICANT CHANGE UP (ref 0–0.9)
MONOCYTES NFR BLD AUTO: 10.2 % — SIGNIFICANT CHANGE UP (ref 2–14)
NEUTROPHILS # BLD AUTO: 3.1 K/UL — SIGNIFICANT CHANGE UP (ref 1.8–7.4)
NEUTROPHILS NFR BLD AUTO: 65.9 % — SIGNIFICANT CHANGE UP (ref 43–77)
NRBC # BLD: 0 /100 WBCS — SIGNIFICANT CHANGE UP (ref 0–0)
PLATELET # BLD AUTO: 179 K/UL — SIGNIFICANT CHANGE UP (ref 150–400)
POTASSIUM SERPL-MCNC: 3.6 MMOL/L — SIGNIFICANT CHANGE UP (ref 3.5–5.3)
POTASSIUM SERPL-SCNC: 3.6 MMOL/L — SIGNIFICANT CHANGE UP (ref 3.5–5.3)
PROT SERPL-MCNC: 7 G/DL — SIGNIFICANT CHANGE UP (ref 6–8.3)
RAPID RVP RESULT: DETECTED
RBC # BLD: 4.23 M/UL — SIGNIFICANT CHANGE UP (ref 3.8–5.2)
RBC # FLD: 11.9 % — SIGNIFICANT CHANGE UP (ref 10.3–14.5)
SARS-COV-2 RNA SPEC QL NAA+PROBE: SIGNIFICANT CHANGE UP
SODIUM SERPL-SCNC: 128 MMOL/L — LOW (ref 135–145)
WBC # BLD: 4.71 K/UL — SIGNIFICANT CHANGE UP (ref 3.8–10.5)
WBC # FLD AUTO: 4.71 K/UL — SIGNIFICANT CHANGE UP (ref 3.8–10.5)

## 2021-11-01 PROCEDURE — 70450 CT HEAD/BRAIN W/O DYE: CPT | Mod: 26,MA

## 2021-11-01 PROCEDURE — 99285 EMERGENCY DEPT VISIT HI MDM: CPT

## 2021-11-01 PROCEDURE — 71250 CT THORAX DX C-: CPT | Mod: 26,MA

## 2021-11-01 PROCEDURE — 71045 X-RAY EXAM CHEST 1 VIEW: CPT | Mod: 26

## 2021-11-01 RX ORDER — ACETAMINOPHEN 500 MG
650 TABLET ORAL ONCE
Refills: 0 | Status: COMPLETED | OUTPATIENT
Start: 2021-11-01 | End: 2021-11-01

## 2021-11-01 RX ORDER — SODIUM CHLORIDE 9 MG/ML
1000 INJECTION INTRAMUSCULAR; INTRAVENOUS; SUBCUTANEOUS ONCE
Refills: 0 | Status: COMPLETED | OUTPATIENT
Start: 2021-11-01 | End: 2021-11-01

## 2021-11-01 RX ORDER — FAMOTIDINE 10 MG/ML
20 INJECTION INTRAVENOUS ONCE
Refills: 0 | Status: COMPLETED | OUTPATIENT
Start: 2021-11-01 | End: 2021-11-01

## 2021-11-01 RX ORDER — IPRATROPIUM/ALBUTEROL SULFATE 18-103MCG
3 AEROSOL WITH ADAPTER (GRAM) INHALATION ONCE
Refills: 0 | Status: COMPLETED | OUTPATIENT
Start: 2021-11-01 | End: 2021-11-01

## 2021-11-01 RX ORDER — SODIUM CHLORIDE 9 MG/ML
1000 INJECTION INTRAMUSCULAR; INTRAVENOUS; SUBCUTANEOUS
Refills: 0 | Status: DISCONTINUED | OUTPATIENT
Start: 2021-11-01 | End: 2021-11-05

## 2021-11-01 RX ORDER — KETOROLAC TROMETHAMINE 30 MG/ML
30 SYRINGE (ML) INJECTION ONCE
Refills: 0 | Status: DISCONTINUED | OUTPATIENT
Start: 2021-11-01 | End: 2021-11-02

## 2021-11-01 RX ORDER — ONDANSETRON 8 MG/1
4 TABLET, FILM COATED ORAL ONCE
Refills: 0 | Status: COMPLETED | OUTPATIENT
Start: 2021-11-01 | End: 2021-11-01

## 2021-11-01 RX ADMIN — FAMOTIDINE 20 MILLIGRAM(S): 10 INJECTION INTRAVENOUS at 21:45

## 2021-11-01 RX ADMIN — Medication 650 MILLIGRAM(S): at 21:44

## 2021-11-01 RX ADMIN — ONDANSETRON 4 MILLIGRAM(S): 8 TABLET, FILM COATED ORAL at 21:44

## 2021-11-01 RX ADMIN — Medication 650 MILLIGRAM(S): at 21:46

## 2021-11-01 RX ADMIN — SODIUM CHLORIDE 1000 MILLILITER(S): 9 INJECTION INTRAMUSCULAR; INTRAVENOUS; SUBCUTANEOUS at 21:46

## 2021-11-01 NOTE — ED PROVIDER NOTE - OBJECTIVE STATEMENT
61 y.o. female c/o not feeling well since Fri., coughing up yellowish sputum, decreased appetite, weakness, nasal congestion, no sob, wheezing, pt also c/o constant frontal HA, nonradiating, no photophobia, phonophobia.  Pt with fever, chills yest., vomited x2 this am, soft stool x1 today, no myalgia, pt completed Moderna vaccine. Pt's  dx with bronchitis last week.  Pt took Tylenol/Advil with no relief

## 2021-11-01 NOTE — ED PROVIDER NOTE - PATIENT PORTAL LINK FT
You can access the FollowMyHealth Patient Portal offered by [facility] by registering at the following website: [email or web address]. By joining [facility]’s FollowMyHealth portal, you will also be able to view your health information using other applications (apps) compatible with our system.

## 2021-11-01 NOTE — ED PROVIDER NOTE - PROGRESS NOTE DETAILS
pt with viral bronchitis, RU/ML opacities worsens from 8/21/21, advised to f/u with pulmonary.  Pt's HA also improves, feeling hungry, will d/c home with daughter.  advised to f/u with PMD.  Pt prefers seeing her neuro pt with viral bronchitis, RU/ML opacities worsens from 8/21/21, advised to f/u with pulmonary.  Pt's HA also improves, feeling hungry, will d/c home with daughter.  advised to f/u with PMD.  Will get pt outpt neuro referral

## 2021-11-01 NOTE — ED PROVIDER NOTE - NSICDXFAMILYHX_GEN_ALL_CORE_FT
FAMILY HISTORY:  Sibling  Still living? Unknown  Family history of bronchiectasis, Age at diagnosis: Age Unknown

## 2021-11-02 PROCEDURE — 99284 EMERGENCY DEPT VISIT MOD MDM: CPT | Mod: 25

## 2021-11-02 PROCEDURE — 96375 TX/PRO/DX INJ NEW DRUG ADDON: CPT

## 2021-11-02 PROCEDURE — 80053 COMPREHEN METABOLIC PANEL: CPT

## 2021-11-02 PROCEDURE — 96374 THER/PROPH/DIAG INJ IV PUSH: CPT

## 2021-11-02 PROCEDURE — 0225U NFCT DS DNA&RNA 21 SARSCOV2: CPT

## 2021-11-02 PROCEDURE — 82009 KETONE BODYS QUAL: CPT

## 2021-11-02 PROCEDURE — 85025 COMPLETE CBC W/AUTO DIFF WBC: CPT

## 2021-11-02 PROCEDURE — 83735 ASSAY OF MAGNESIUM: CPT

## 2021-11-02 PROCEDURE — 71250 CT THORAX DX C-: CPT | Mod: MA

## 2021-11-02 PROCEDURE — 71045 X-RAY EXAM CHEST 1 VIEW: CPT

## 2021-11-02 PROCEDURE — 70450 CT HEAD/BRAIN W/O DYE: CPT | Mod: MA

## 2021-11-02 PROCEDURE — 83690 ASSAY OF LIPASE: CPT

## 2021-11-02 PROCEDURE — 36415 COLL VENOUS BLD VENIPUNCTURE: CPT

## 2021-11-02 PROCEDURE — 94640 AIRWAY INHALATION TREATMENT: CPT

## 2021-11-02 RX ADMIN — Medication 30 MILLIGRAM(S): at 01:34

## 2021-11-02 RX ADMIN — Medication 3 MILLILITER(S): at 01:32

## 2021-11-05 ENCOUNTER — APPOINTMENT (OUTPATIENT)
Dept: NEUROLOGY | Facility: CLINIC | Age: 61
End: 2021-11-05
Payer: COMMERCIAL

## 2021-11-05 VITALS
BODY MASS INDEX: 17.68 KG/M2 | DIASTOLIC BLOOD PRESSURE: 88 MMHG | TEMPERATURE: 97.3 F | HEART RATE: 92 BPM | HEIGHT: 66 IN | SYSTOLIC BLOOD PRESSURE: 143 MMHG | OXYGEN SATURATION: 97 % | WEIGHT: 110 LBS

## 2021-11-05 DIAGNOSIS — G44.52 NEW DAILY PERSISTENT HEADACHE (NDPH): ICD-10-CM

## 2021-11-05 PROCEDURE — 99204 OFFICE O/P NEW MOD 45 MIN: CPT

## 2021-11-12 ENCOUNTER — RESULT REVIEW (OUTPATIENT)
Age: 61
End: 2021-11-12

## 2021-11-17 ENCOUNTER — APPOINTMENT (OUTPATIENT)
Dept: PULMONOLOGY | Facility: CLINIC | Age: 61
End: 2021-11-17
Payer: COMMERCIAL

## 2021-11-17 VITALS
HEART RATE: 88 BPM | WEIGHT: 105 LBS | SYSTOLIC BLOOD PRESSURE: 152 MMHG | OXYGEN SATURATION: 97 % | HEIGHT: 66 IN | BODY MASS INDEX: 16.88 KG/M2 | DIASTOLIC BLOOD PRESSURE: 86 MMHG | RESPIRATION RATE: 12 BRPM | TEMPERATURE: 97.9 F

## 2021-11-17 PROCEDURE — 99215 OFFICE O/P EST HI 40 MIN: CPT

## 2021-11-17 RX ORDER — AZITHROMYCIN 500 MG/1
500 TABLET, FILM COATED ORAL DAILY
Qty: 5 | Refills: 0 | Status: DISCONTINUED | COMMUNITY
Start: 2018-04-25 | End: 2021-11-17

## 2021-11-19 NOTE — HISTORY OF PRESENT ILLNESS
[TextBox_4] : 61 year old female with h/o Pulmonary MAC (treated before 2018 for around 1 and half year with ethambutol and azithromycin. Patient developed side effects from rifampicin, hence it was d/c). Patient was followed by Dr. Guillen previously and she came today to establish care. \par Patient is using VEST, aerobica and hypertonic saline for airway clearance. Patient is c/o more cough and expectoration then before. She is also feeling slightly more SOB than usual.

## 2021-11-19 NOTE — DISCUSSION/SUMMARY
[FreeTextEntry1] : 61 year old female with h/o MAC (s/p treatment with ethambutol and azithromycin for 1 and half year in past) with persistent sputum positive for MAC.\par \par Review:\par Radiology:\par - CT chest (8/21): Bilateral RML, lingula bronchiectasis (stable). New focus of small airway infection in the peripheral upper lobe with tree in bud nodules with slightly more focal/dominant central nodule measuring 7 mm. \par - CT chest (2019): RML and lingula bronchiectasis\par \par Lung function test:\par - PFT (8 /21 ): FEV1 71, FEV1/FVC 73, TLC 76, DLCO 69, Rev 2%\par - 6 minute walk test (8/21): 96% on RA with ambulation\par \par Microbiology:\par - Sputum culture results (7/21):\par AFB : MAC (sensitivity pending)\par Bacterial: normal estella\par Fungus: Rare aspergillus niger\par \par - Sputum culture results (2018):\par Sputum AFB 1/30/18: no growth at 6 weeks\par Sputum C&S 1/30/18: numerous mucin producing Pseudomonas aeruginosa\par Sputum C&S 2/22/18: numerous mucin producing Pseudomonas aeruginosa\par \par Labs for bronchiectasis:\par All labs for bronchiectasis were non contributory\par \par A/P\par 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 I am waiting for final culture sensitivity from [place] before considering to restart treatment for MAC. Patient agrees with plan of holding on MAC treatment for now. She will call me if her symptoms are getting worse. \par

## 2021-11-19 NOTE — PHYSICAL EXAM
[No Acute Distress] : no acute distress [Well Nourished] : well nourished [Normal Oropharynx] : normal oropharynx [II] : Mallampati Class: II [Normal Appearance] : normal appearance [No JVD] : no jvd [Normal Rate/Rhythm] : normal rate/rhythm [Normal S1, S2] : normal s1, s2 [No Resp Distress] : no resp distress [No Acc Muscle Use] : no acc muscle use [No Abnormalities] : no abnormalities [Benign] : benign [Not Tender] : not tender [Normal Gait] : normal gait [No Clubbing] : no clubbing [No Cyanosis] : no cyanosis [No Edema] : no edema [Normal Color/ Pigmentation] : normal color/ pigmentation [No Rash] : no rash [No Focal Deficits] : no focal deficits [No Sensory Deficits] : no sensory deficits [Oriented x3] : oriented x3 [Normal Affect] : normal affect [TextBox_68] : crepitations

## 2021-11-19 NOTE — REVIEW OF SYSTEMS
[Fever] : no fever [Chills] : no chills [Dry Eyes] : no dry eyes [Eye Irritation] : no eye irritation [Cough] : cough [Sputum] : sputum [SOB on Exertion] : sob on exertion [Chest Discomfort] : no chest discomfort [Orthopnea] : no orthopnea [Hay Fever] : no hay fever [Nasal Discharge] : no nasal discharge [GERD] : no gerd [Diabetes] : no diabetes [Thyroid Problem] : no thyroid problem

## 2021-11-22 LAB — BACTERIA SPT CULT: NORMAL

## 2021-11-23 ENCOUNTER — OUTPATIENT (OUTPATIENT)
Dept: OUTPATIENT SERVICES | Facility: HOSPITAL | Age: 61
LOS: 1 days | End: 2021-11-23
Payer: COMMERCIAL

## 2021-11-23 ENCOUNTER — APPOINTMENT (OUTPATIENT)
Dept: MRI IMAGING | Facility: HOSPITAL | Age: 61
End: 2021-11-23

## 2021-11-23 PROCEDURE — 70551 MRI BRAIN STEM W/O DYE: CPT

## 2021-11-23 PROCEDURE — 70551 MRI BRAIN STEM W/O DYE: CPT | Mod: 26

## 2021-11-29 ENCOUNTER — TRANSCRIPTION ENCOUNTER (OUTPATIENT)
Age: 61
End: 2021-11-29

## 2021-11-30 NOTE — HISTORY OF PRESENT ILLNESS
[FreeTextEntry1] : Started with symptoms of "cold" on Friday. Headache with nausea and vomiting on the weekend and was seen in the  ER. Now has a heaviness. The severe pain was gone by Tuesday. She has bronchiectasis and CHALINO

## 2021-11-30 NOTE — DISCUSSION/SUMMARY
[FreeTextEntry1] : Investigate cortical venous sinus thrombosis and infection by MR and MRV of head . continue current medications.

## 2021-12-06 LAB — BACTERIA SPT CULT: ABNORMAL

## 2021-12-10 ENCOUNTER — NON-APPOINTMENT (OUTPATIENT)
Age: 61
End: 2021-12-10

## 2021-12-15 ENCOUNTER — APPOINTMENT (OUTPATIENT)
Dept: PULMONOLOGY | Facility: CLINIC | Age: 61
End: 2021-12-15
Payer: COMMERCIAL

## 2021-12-15 LAB — FUNGUS SPT CULT: ABNORMAL

## 2021-12-15 PROCEDURE — 99443: CPT

## 2021-12-17 NOTE — DISCUSSION/SUMMARY
[FreeTextEntry1] : 61 year old female with h/o MAC (s/p treatment with ethambutol and azithromycin for 1 and half year in past) with persistent sputum positive for MAC.\par \par Review:\par Radiology:\par - CT chest (8/21): Bilateral RML, lingula bronchiectasis (stable). New focus of small airway infection in the peripheral upper lobe with tree in bud nodules with slightly more focal/dominant central nodule measuring 7 mm. \par - CT chest (2019): RML and lingula bronchiectasis\par \par Lung function test:\par - PFT (8 /21 ): FEV1 71, FEV1/FVC 73, TLC 76, DLCO 69, Rev 2%\par - 6 minute walk test (8/21): 96% on RA with ambulation\par \par Microbiology:\par - Sputum culture results (7/21):\par AFB : MAC (sensitivity pending)\par Bacterial: normal estella\par Fungus: Rare aspergillus niger\par \par - Sputum culture results (2018):\par Sputum AFB 1/30/18: no growth at 6 weeks\par Sputum C&S 1/30/18: numerous mucin producing Pseudomonas aeruginosa\par Sputum C&S 2/22/18: numerous mucin producing Pseudomonas aeruginosa\par \par Labs for bronchiectasis:\par All labs for bronchiectasis were non contributory\par \par A/P\par 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 \par - Culture from July 2021 showed MAC (sensitivity sent to [facility] but still pending results)\par - Repeat CT chest in August 2021 showed persistent bronchiectasis with new upper lobe tree in bud opacities\par Plan:\par - Continue Breo 1 puff daily with gargle\par - VEST, Aerobica and hypertonic saline for airway clearance\par - I will call again to get sensitivity report for MAC\par - Follow up in 3 months.\par  \par

## 2021-12-17 NOTE — HISTORY OF PRESENT ILLNESS
[TextBox_4] : 61 year old female with h/o Pulmonary MAC (treated before 2018 for around 1 and half year with ethambutol and azithromycin. Patient developed side effects from rifampicin, hence it was d/c). Patient was followed by Dr. Guillen previously and she came today to establish care. \par Patient is using VEST, aerobica and hypertonic saline for airway clearance. Patient is c/o more cough and expectoration then before. She is also feeling slightly more SOB than usual. \par \par 12/15/21:\par Patient was treated with Levofloxacin for pseudomonas in the past. She feels much better. Sensitivity for MAC is still pending.

## 2021-12-17 NOTE — REVIEW OF SYSTEMS
[SOB on Exertion] : sob on exertion [Fever] : no fever [Chills] : no chills [Dry Eyes] : no dry eyes [Eye Irritation] : no eye irritation [Cough] : no cough [Sputum] : no sputum [Chest Discomfort] : no chest discomfort [Orthopnea] : no orthopnea [Hay Fever] : no hay fever [Nasal Discharge] : no nasal discharge [GERD] : no gerd [Diabetes] : no diabetes [Thyroid Problem] : no thyroid problem

## 2022-01-10 LAB — ACID FAST STN SPT: ABNORMAL

## 2022-01-19 LAB — ACID FAST STN SPT: ABNORMAL

## 2022-01-25 ENCOUNTER — APPOINTMENT (OUTPATIENT)
Dept: NEUROLOGY | Facility: CLINIC | Age: 62
End: 2022-01-25
Payer: COMMERCIAL

## 2022-01-25 VITALS
DIASTOLIC BLOOD PRESSURE: 71 MMHG | BODY MASS INDEX: 17.19 KG/M2 | OXYGEN SATURATION: 98 % | TEMPERATURE: 97.7 F | WEIGHT: 107 LBS | SYSTOLIC BLOOD PRESSURE: 114 MMHG | HEIGHT: 66 IN | HEART RATE: 71 BPM

## 2022-01-25 PROCEDURE — 99214 OFFICE O/P EST MOD 30 MIN: CPT

## 2022-02-01 NOTE — PHYSICAL EXAM
[General Appearance - Alert] : alert [General Appearance - In No Acute Distress] : in no acute distress [Person] : oriented to person [Place] : oriented to place [Time] : oriented to time [Concentration Intact] : normal concentrating ability [Visual Intact] : visual attention was ~T not ~L decreased [Naming Objects] : no difficulty naming common objects [Repeating Phrases] : no difficulty repeating a phrase [Writing A Sentence] : no difficulty writing a sentence [Fluency] : fluency intact [Comprehension] : comprehension intact [Reading] : reading intact [Past History] : adequate knowledge of personal past history [Cranial Nerves Optic (II)] : visual acuity intact bilaterally,  visual fields full to confrontation, pupils equal round and reactive to light [Cranial Nerves Oculomotor (III)] : extraocular motion intact [Cranial Nerves Trigeminal (V)] : facial sensation intact symmetrically [Cranial Nerves Facial (VII)] : face symmetrical [Cranial Nerves Vestibulocochlear (VIII)] : hearing was intact bilaterally [Cranial Nerves Glossopharyngeal (IX)] : tongue and palate midline [Cranial Nerves Accessory (XI - Cranial And Spinal)] : head turning and shoulder shrug symmetric [Cranial Nerves Hypoglossal (XII)] : there was no tongue deviation with protrusion [Motor Tone] : muscle tone was normal in all four extremities [Motor Strength] : muscle strength was normal in all four extremities [No Muscle Atrophy] : normal bulk in all four extremities [Motor Strength Upper Extremities Bilaterally] : strength was normal in both upper extremities [Motor Strength Lower Extremities Bilaterally] : strength was normal in both lower extremities [Sensation Tactile Decrease] : light touch was intact [Abnormal Walk] : normal gait [Balance] : balance was intact [Past-pointing] : there was no past-pointing [Tremor] : no tremor present [Dysdiadochokinesia Bilaterally] : not present [Coordination - Dysmetria Impaired Finger-to-Nose Bilateral] : not present [Coordination - Dysmetria Impaired Heel-to-Shin Bilateral] : not present [2+] : Ankle jerk left 2+ [Plantar Reflex Right Only] : normal on the right [Plantar Reflex Left Only] : normal on the left [Sclera] : the sclera and conjunctiva were normal [PERRL With Normal Accommodation] : pupils were equal in size, round, reactive to light, with normal accommodation [Extraocular Movements] : extraocular movements were intact [Outer Ear] : the ears and nose were normal in appearance [Oropharynx] : the oropharynx was normal [Neck Appearance] : the appearance of the neck was normal [Neck Cervical Mass (___cm)] : no neck mass was observed [Jugular Venous Distention Increased] : there was no jugular-venous distention [Thyroid Diffuse Enlargement] : the thyroid was not enlarged [Thyroid Nodule] : there were no palpable thyroid nodules

## 2022-02-01 NOTE — DISCUSSION/SUMMARY
[FreeTextEntry1] : Reviewed MRI - incomplete because the patient declined; discussed T 2 signal abnormalities and the significance of small vessel ischemic disease - risk for dementia, ataxia, strokes. She declined treatment. Follow up as needed

## 2022-02-01 NOTE — DATA REVIEWED
[de-identified] : EXAM: MR BRAIN\par \par PROCEDURE DATE: 11/23/2021\par \par \par \par INTERPRETATION: CLINICAL INFORMATION: Severe headache\par \par Exam:\par Noncontrast MRI of the brain is performed, but partially. The patient could not tolerate a complete exam.\par \par Axial diffusion imaging, and T2-FLAIR series are provided, as well as a sagittal T1 volumetric scan is obtained, the latter of which axial and coronal reformations are provided.\par \par Comparison: Head CT 11/01/2021\par \par Findings:\par \par The T2-FLAIR images of the brain show a multitude of small signal foci involving the subcortical cerebral white matter etiology is unclear and can be seen in the setting of small vessel ischemic change or potential advanced migraine disorder. No lesion is seen in the corpus callosum, brainstem or cerebellar peduncles nor at other sites typical of a demyelinating process. Alternate etiologies could be sequela of prior inflammatory or infectious process or less likely a vasculitis. Differential diagnosis is broad. Diffusion imaging is negative for recent infarcts of any of these sites. There is no hydrocephalus, extra-axial fluid collection or mass effect. Ventricles appear normal in size and configuration. The sagittal T1 images show grossly normal midline anatomy. No cerebellar tonsillar ectopia.\par \par \par IMPRESSION:\par \par Incomplete exam. No recent infarct. T2-FLAIR shows a multitude of nonspecific subcortical white matter foci as above.\par \par No space occupying lesion, hydrocephalus or mass effect to explain headache.\par \par --- End of Report ---\par \par \par \par \par Thank you for the opportunity to participate in the care of this patient.\par \par \par TAMMY FINK MD; Attending Radiologist\par This document has been electronically signed. Nov 23 2021 7:31PM

## 2022-02-04 LAB — FUNGUS SPT CULT: ABNORMAL

## 2022-02-16 ENCOUNTER — APPOINTMENT (OUTPATIENT)
Dept: PULMONOLOGY | Facility: CLINIC | Age: 62
End: 2022-02-16
Payer: COMMERCIAL

## 2022-02-16 VITALS
HEIGHT: 66 IN | TEMPERATURE: 97.3 F | HEART RATE: 75 BPM | RESPIRATION RATE: 12 BRPM | SYSTOLIC BLOOD PRESSURE: 132 MMHG | WEIGHT: 107 LBS | DIASTOLIC BLOOD PRESSURE: 75 MMHG | BODY MASS INDEX: 17.19 KG/M2 | OXYGEN SATURATION: 99 %

## 2022-02-16 PROCEDURE — 99214 OFFICE O/P EST MOD 30 MIN: CPT

## 2022-02-16 NOTE — HISTORY OF PRESENT ILLNESS
[TextBox_4] : 61 year old female with h/o Pulmonary MAC (treated before 2018 for around 1 and half year with ethambutol and azithromycin. Patient developed side effects from rifampicin, hence it was d/c). Patient was followed by Dr. Guillen previously and she came today to establish care. \par Patient is using VEST, aerobica and hypertonic saline for airway clearance. Patient is c/o more cough and expectoration then before. She is also feeling slightly more SOB than usual. \par \par 12/15/21:\par Patient was treated with Levofloxacin for pseudomonas in the past. She feels much better. Sensitivity for MAC is still pending. \par \par 2/16/22:\par Patient remains symptomatic. Denies cough or expectoration. Patient had CT chest in Nov 2021 for persistent cough.  [ESS] : 2

## 2022-02-16 NOTE — DISCUSSION/SUMMARY
[FreeTextEntry1] : 61 year old female with h/o MAC (s/p treatment with ethambutol and azithromycin for 1 and half year in the past) with persistent sputum positive for MAC with h/o pseudomonas in sputum\par \par Review:\par Radiology:\par - CT chest (11/21):Mild interval worsening of patchy airspace and branching "tree in bud" opacities, most prominent in the right upper and right middle lobes, likely infectious or inflammatory in etiology. Persistent RML and Lingula bronchiectasis (no change) \par \par - CT chest (8/21): Bilateral RML, lingula bronchiectasis (stable). New focus of small airway infection in the peripheral upper lobe with tree in bud nodules with slightly more focal/dominant central nodule measuring 7 mm. \par - CT chest (2019): RML and lingula bronchiectasis\par \par Lung function test:\par - PFT (8 /21 ): FEV1 71, FEV1/FVC 73, TLC 76, DLCO 69, Rev 2%\par - 6 minute walk test (8/21): 96% on RA with ambulation\par \par Microbiology:\par - Sputum culture results (7/21):\par AFB : MAC (sensitivity: Sensitive to rifampin and ethambutol, synergistic (ethambutol and rifampin). 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 Clarithromycin VENU 16 (intermediate sen). Sensitive to rifampin and ethambutol, synergistic (ethambutol and rifampin). \par - Repeat CT chest in August 2021 showed persistent bronchiectasis with new upper lobe tree in bud opacities. Plan to repeat CT chest during next visit in 6 months.\par Plan:\par - Continue Breo 1 puff daily with gargle\par - VEST, Aerobica and hypertonic saline for airway clearance\par - No indication for treatment of MAC at this time. Patient remains asymptomatic\par - Repeat PFT during next visit. \par - Follow up in 6 months.\par  \par

## 2022-03-31 ENCOUNTER — TRANSCRIPTION ENCOUNTER (OUTPATIENT)
Age: 62
End: 2022-03-31

## 2022-08-12 ENCOUNTER — NON-APPOINTMENT (OUTPATIENT)
Age: 62
End: 2022-08-12

## 2022-08-17 ENCOUNTER — APPOINTMENT (OUTPATIENT)
Dept: PULMONOLOGY | Facility: CLINIC | Age: 62
End: 2022-08-17

## 2022-08-17 VITALS
HEIGHT: 66 IN | SYSTOLIC BLOOD PRESSURE: 130 MMHG | TEMPERATURE: 97.6 F | WEIGHT: 113 LBS | OXYGEN SATURATION: 98 % | BODY MASS INDEX: 18.16 KG/M2 | HEART RATE: 92 BPM | DIASTOLIC BLOOD PRESSURE: 76 MMHG | RESPIRATION RATE: 12 BRPM

## 2022-08-17 PROCEDURE — 94060 EVALUATION OF WHEEZING: CPT

## 2022-08-17 PROCEDURE — 94729 DIFFUSING CAPACITY: CPT

## 2022-08-17 PROCEDURE — ZZZZZ: CPT

## 2022-08-17 PROCEDURE — 94618 PULMONARY STRESS TESTING: CPT

## 2022-08-17 PROCEDURE — 99214 OFFICE O/P EST MOD 30 MIN: CPT | Mod: 25

## 2022-08-17 PROCEDURE — 94727 GAS DIL/WSHOT DETER LNG VOL: CPT

## 2022-08-17 RX ORDER — ALBUTEROL SULFATE 90 UG/1
108 (90 BASE) INHALANT RESPIRATORY (INHALATION)
Qty: 1 | Refills: 5 | Status: ACTIVE | COMMUNITY
Start: 2022-08-17 | End: 1900-01-01

## 2022-08-19 NOTE — DISCUSSION/SUMMARY
[FreeTextEntry1] : 61 year old female with h/o MAC (s/p treatment with ethambutol and azithromycin for 1 and half year in the past) with persistent sputum positive for MAC with h/o pseudomonas in sputum\par \par Review:\par Radiology:\par - CT chest (11/21):Mild interval worsening of patchy airspace and branching "tree in bud" opacities, most prominent in the right upper and right middle lobes, likely infectious or inflammatory in etiology. Persistent RML and Lingula bronchiectasis (no change) \par \par - CT chest (8/21): Bilateral RML, lingula bronchiectasis (stable). New focus of small airway infection in the peripheral upper lobe with tree in bud nodules with slightly more focal/dominant central nodule measuring 7 mm. \par - CT chest (2019): RML and lingula bronchiectasis\par \par Lung function test:\par - PFT (8 /22): FEV1 66, FEV1/FVC 71, TLC 78, DLCO 67\par - Six minute walk test (8/22): 94% on RA, HR increased from 68 to 104/minute with exertion. \par - PFT (8 /21 ): FEV1 71, FEV1/FVC 73, TLC 76, DLCO 69, Rev 2%\par - 6 minute walk test (8/21): 96% on RA with ambulation\par \par Microbiology:\par - Sputum culture results (7/21):\par AFB : MAC (sensitivity: Sensitive to rifampin and ethambutol, synergistic (ethambutol and rifampin). 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 Clarithromycin VENU 16 (intermediate sen). Sensitive to rifampin and ethambutol, synergistic (ethambutol and rifampin). \par - Repeat CT chest in August 2021 showed persistent bronchiectasis with new upper lobe tree in bud opacities. Plan to repeat CT chest during next visit in 6 months.\par - No significant change in PFT.\par Plan:\par - d/c Breo and change to albuterol\par - VEST, Aerobica and hypertonic saline for airway clearance\par - No indication for treatment of MAC at this time. Patient remains asymptomatic \par - Patient was given 2 weeks of levofloxacin to keep with her during her visit to [place] for work. \par - Follow up in 6 months.\par \par (2) Allergic Rhinitis:\par - Flonase (patient has night time cough)\par  \par

## 2022-08-19 NOTE — HISTORY OF PRESENT ILLNESS
[Never] : never [TextBox_4] : 61 year old female with h/o Pulmonary MAC (treated before 2018 for around 1 and half year with ethambutol and azithromycin. Patient developed side effects from rifampicin, hence it was d/c). Patient was followed by Dr. Guillen previously and she came today to establish care. \par Patient is using VEST, Aerobica and hypertonic saline for airway clearance. Patient is c/o more cough and expectoration then before. She is also feeling slightly more SOB than usual. \par \par 12/15/21:\par Patient was treated with Levofloxacin for pseudomonas in the past. She feels much better. Sensitivity for MAC is still pending. \par \par 2/16/22:\par Patient remains symptomatic. Denies cough or expectoration. Patient had CT chest in Nov 2021 for persistent cough. \par \par 8/17/22:\par No exacerbation since last visit. Complaint with Aerobika, hypertonic saline and VEST.  [ESS] : 2

## 2022-08-19 NOTE — REVIEW OF SYSTEMS
[Fever] : no fever [Chills] : no chills [Dry Eyes] : no dry eyes [Eye Irritation] : no eye irritation [Cough] : no cough [Sputum] : no sputum [SOB on Exertion] : sob on exertion [Chest Discomfort] : no chest discomfort [Orthopnea] : no orthopnea [Hay Fever] : no hay fever [Nasal Discharge] : no nasal discharge [GERD] : no gerd [Diabetes] : no diabetes [Thyroid Problem] : no thyroid problem

## 2022-08-22 LAB — SARS-COV-2 N GENE NPH QL NAA+PROBE: NOT DETECTED

## 2023-03-22 ENCOUNTER — APPOINTMENT (OUTPATIENT)
Dept: PULMONOLOGY | Facility: CLINIC | Age: 63
End: 2023-03-22
Payer: COMMERCIAL

## 2023-03-22 VITALS
TEMPERATURE: 97.3 F | BODY MASS INDEX: 18.64 KG/M2 | DIASTOLIC BLOOD PRESSURE: 79 MMHG | WEIGHT: 116 LBS | SYSTOLIC BLOOD PRESSURE: 140 MMHG | OXYGEN SATURATION: 96 % | HEART RATE: 64 BPM | HEIGHT: 66 IN

## 2023-03-22 DIAGNOSIS — J30.9 ALLERGIC RHINITIS, UNSPECIFIED: ICD-10-CM

## 2023-03-22 PROCEDURE — 99214 OFFICE O/P EST MOD 30 MIN: CPT

## 2023-03-23 PROBLEM — J30.9 ALLERGIC RHINITIS: Status: ACTIVE | Noted: 2022-08-17

## 2023-03-23 NOTE — ED PROVIDER NOTE - CROS ED CARDIOVAS ALL NEG
decide your lung cancer risk. What are the risks of screening? CT screening for lung cancer isn't perfect. It can show an abnormal result when it turns out there wasn't any cancer. This is called a false-positive result. This means you may need more tests to make sure you don't have cancer. These tests can be harmful and cause a lot of worry. These tests may include more CT scans and invasive testing like a lung biopsy. In a biopsy, the doctor takes a sample of tissue from inside your lung so it can be looked at under a microscope. A biopsy is the only way to tell if you have lung cancer. If the biopsy finds cancer, you and your doctor will have to decide how or whether to treat it. Some lung cancers found on CT scans are harmless and would not have caused a problem if they had not been found through screening. But because doctors can't tell which ones will turn out to be harmless, most will be treated. This means that you may get treatment--including surgery, radiation, or chemotherapy--that you don't need. There is a risk of damage to cells or tissue from being exposed to radiation, including the small amounts used in CTs, X-rays, and other medical tests. Over time, exposure to radiation may cause cancer and other health problems. But in most cases, the risk of getting cancer from being exposed to small amounts of radiation is low. It's not a reason to avoid these tests for most people. What are the benefits of screening? Your scan may be normal (negative). For some people who are at higher risk, screening lowers the chance of dying of lung cancer. How much and how long you smoked helps to determine your risk level. Screening can find some cancers early, when treatment may be more likely to work. What happens after screening? The results of your CT scan will be sent to your doctor. Someone from your care team will explain the results of your scan and answer any questions you may have.  If you need any negative... 0

## 2023-03-23 NOTE — PHYSICAL EXAM
[No Acute Distress] : no acute distress [Well Nourished] : well nourished [Normal Oropharynx] : normal oropharynx [II] : Mallampati Class: II [Normal Appearance] : normal appearance [No JVD] : no jvd [Normal Rate/Rhythm] : normal rate/rhythm [Normal S1, S2] : normal s1, s2 [No Resp Distress] : no resp distress [No Acc Muscle Use] : no acc muscle use [No Abnormalities] : no abnormalities [Benign] : benign [Not Tender] : not tender [Normal Gait] : normal gait [No Clubbing] : no clubbing [No Cyanosis] : no cyanosis [No Edema] : no edema [Normal Color/ Pigmentation] : normal color/ pigmentation [No Rash] : no rash [No Focal Deficits] : no focal deficits [No Sensory Deficits] : no sensory deficits [Oriented x3] : oriented x3 [Normal Affect] : normal affect [TextBox_68] : crepitations, wheezing

## 2023-03-23 NOTE — HISTORY OF PRESENT ILLNESS
[Never] : never [TextBox_4] : 61 year old female with h/o Pulmonary MAC (treated before 2018 for around 1 and half year with ethambutol and azithromycin. Patient developed side effects from rifampicin, hence it was d/c). Patient was followed by Dr. Guillen previously and she came today to establish care. \par Patient is using VEST, Aerobica and hypertonic saline for airway clearance. Patient is c/o more cough and expectoration then before. She is also feeling slightly more SOB than usual. \par \par 12/15/21:\par Patient was treated with Levofloxacin for pseudomonas in the past. She feels much better. Sensitivity for MAC is still pending. \par \par 2/16/22:\par Patient remains symptomatic. Denies cough or expectoration. Patient had CT chest in Nov 2021 for persistent cough. \par \par 8/17/22:\par No exacerbation since last visit. Complaint with Aerobika, hypertonic saline and VEST. \par \par 3/22/23:\par Patient stopped Breo after last visit. Used albuterol for 2-3 times since last visit. She was recently diagnosed as COVID and started to have wheezing again.  [ESS] : 2

## 2023-03-23 NOTE — DISCUSSION/SUMMARY
[FreeTextEntry1] : 61 year old female with h/o MAC (s/p treatment with ethambutol and azithromycin for 1 and half year in the past) with persistent sputum positive for MAC with h/o pseudomonas in sputum\par \par Review:\par Radiology:\par - CT chest (11/21):Mild interval worsening of patchy airspace and branching "tree in bud" opacities, most prominent in the right upper and right middle lobes, likely infectious or inflammatory in etiology. Persistent RML and Lingula bronchiectasis (no change) \par \par - CT chest (8/21): Bilateral RML, lingula bronchiectasis (stable). New focus of small airway infection in the peripheral upper lobe with tree in bud nodules with slightly more focal/dominant central nodule measuring 7 mm. \par - CT chest (2019): RML and lingula bronchiectasis\par \par Lung function test:\par - PFT (8 /22): FEV1 66, FEV1/FVC 71, TLC 78, DLCO 67\par - Six minute walk test (8/22): 94% on RA, HR increased from 68 to 104/minute with exertion. \par - PFT (8 /21 ): FEV1 71, FEV1/FVC 73, TLC 76, DLCO 69, Rev 2%\par - 6 minute walk test (8/21): 96% on RA with ambulation\par \par Microbiology:\par - Sputum culture results (7/21):\par AFB : MAC (sensitivity: Sensitive to rifampin and ethambutol, synergistic (ethambutol and rifampin). 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 Clarithromycin VENU 16 (intermediate sen). Sensitive to rifampin and ethambutol, synergistic (ethambutol and rifampin). \par - Repeat CT chest in August 2021 showed persistent bronchiectasis with new upper lobe tree in bud opacities. Plan to repeat CT chest during next visit in 6 months.\par - No significant change in PFT.\par Plan:\par - Restart Breo and continue as needed albuterol. Most likley worsening symptoms due to COVID. Will consider to stop Breo after 1 month use.\par - VEST, Aerobica and hypertonic saline for airway clearance to continue\par - No indication for treatment of MAC at this time. Patient remains asymptomatic \par - Patient was given 2 weeks of levofloxacin to keep with her during her visit to [place] for work. \par - Follow up in 6 months.\par \par (2) Allergic Rhinitis:\par - Restart Flonase nasal spray\par  \par

## 2023-04-26 ENCOUNTER — APPOINTMENT (OUTPATIENT)
Dept: PULMONOLOGY | Facility: CLINIC | Age: 63
End: 2023-04-26
Payer: COMMERCIAL

## 2023-04-26 VITALS
HEART RATE: 78 BPM | DIASTOLIC BLOOD PRESSURE: 78 MMHG | TEMPERATURE: 96.7 F | SYSTOLIC BLOOD PRESSURE: 133 MMHG | WEIGHT: 116 LBS | OXYGEN SATURATION: 97 % | BODY MASS INDEX: 18.64 KG/M2 | HEIGHT: 66 IN

## 2023-04-26 PROCEDURE — 99214 OFFICE O/P EST MOD 30 MIN: CPT

## 2023-04-26 NOTE — PHYSICAL EXAM
[No Acute Distress] : no acute distress [Well Nourished] : well nourished [Normal Oropharynx] : normal oropharynx [II] : Mallampati Class: II [Normal Appearance] : normal appearance [No JVD] : no jvd [Normal Rate/Rhythm] : normal rate/rhythm [Normal S1, S2] : normal s1, s2 [No Resp Distress] : no resp distress [No Acc Muscle Use] : no acc muscle use [No Abnormalities] : no abnormalities [Benign] : benign [Not Tender] : not tender [Normal Gait] : normal gait [No Clubbing] : no clubbing [No Cyanosis] : no cyanosis [No Edema] : no edema [Oriented x3] : oriented x3 [Normal Affect] : normal affect [TextBox_68] : crepitations, wheezing

## 2023-04-26 NOTE — HISTORY OF PRESENT ILLNESS
[Never] : never [TextBox_4] : 61 year old female with h/o Pulmonary MAC (treated before 2018 for around 1 and half year with ethambutol and azithromycin. Patient developed side effects from rifampicin, hence it was d/c). Patient was followed by Dr. Guillen previously and she came today to establish care. \par Patient is using VEST, Aerobica and hypertonic saline for airway clearance. Patient is c/o more cough and expectoration then before. She is also feeling slightly more SOB than usual. \par \par 12/15/21:\par Patient was treated with Levofloxacin for pseudomonas in the past. She feels much better. Sensitivity for MAC is still pending. \par \par 2/16/22:\par Patient remains symptomatic. Denies cough or expectoration. Patient had CT chest in Nov 2021 for persistent cough. \par \par 8/17/22:\par No exacerbation since last visit. Complaint with Aerobika, hypertonic saline and VEST. \par \par 3/22/23:\par Patient stopped Breo after last visit. Used albuterol for 2-3 times since last visit. She was recently diagnosed as COVID and started to have wheezing again.  \par \par 4/26/23:\par Patient has restarted Breo. She had episode of chest pain which required hospitalization. Etiology was not clear. Symptoms resolved. Denies change in cough or expectoration.  [ESS] : 2

## 2023-04-26 NOTE — DISCUSSION/SUMMARY
[FreeTextEntry1] : 61 year old female with h/o MAC (s/p treatment with ethambutol and azithromycin for 1 and half year in the past) with persistent sputum positive for MAC with h/o pseudomonas in sputum\par \par Review:\par Radiology:\par - CT chest (11/21):Mild interval worsening of patchy airspace and branching "tree in bud" opacities, most prominent in the right upper and right middle lobes, likely infectious or inflammatory in etiology. Persistent RML and Lingula bronchiectasis (no change) \par \par - CT chest (8/21): Bilateral RML, lingula bronchiectasis (stable). New focus of small airway infection in the peripheral upper lobe with tree in bud nodules with slightly more focal/dominant central nodule measuring 7 mm. \par - CT chest (2019): RML and lingula bronchiectasis\par \par Lung function test:\par - PFT (8 /22): FEV1 66, FEV1/FVC 71, TLC 78, DLCO 67\par - Six minute walk test (8/22): 94% on RA, HR increased from 68 to 104/minute with exertion. \par - PFT (8 /21 ): FEV1 71, FEV1/FVC 73, TLC 76, DLCO 69, Rev 2%\par - 6 minute walk test (8/21): 96% on RA with ambulation\par \par Microbiology:\par - Sputum culture results (7/21):\par AFB : MAC (sensitivity: Sensitive to rifampin and ethambutol, synergistic (ethambutol and rifampin). 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 Clarithromycin VENU 16 (intermediate sen). Sensitive to rifampin and ethambutol, synergistic (ethambutol and rifampin). \par - Repeat CT chest in August 2021 showed persistent bronchiectasis with new upper lobe tree in bud opacities. Plan to repeat CT chest during next visit in 6 months.\par - No significant change in PFT.\par Plan:\par - Continue Breo and continue as needed albuterol.\par - VEST, Aerobica and hypertonic saline for airway clearance to continue\par - No indication for treatment of MAC at this time. Patient remains asymptomatic \par -Repeat CT chest. \par - Follow up in 6 months.\par \par (2) Allergic Rhinitis:\par - Continue Flonase nasal spray\par  \par

## 2023-05-08 ENCOUNTER — TRANSCRIPTION ENCOUNTER (OUTPATIENT)
Age: 63
End: 2023-05-08

## 2023-05-12 ENCOUNTER — OUTPATIENT (OUTPATIENT)
Dept: OUTPATIENT SERVICES | Facility: HOSPITAL | Age: 63
LOS: 1 days | End: 2023-05-12
Payer: COMMERCIAL

## 2023-05-12 ENCOUNTER — RESULT REVIEW (OUTPATIENT)
Age: 63
End: 2023-05-12

## 2023-05-12 ENCOUNTER — APPOINTMENT (OUTPATIENT)
Dept: CT IMAGING | Facility: HOSPITAL | Age: 63
End: 2023-05-12

## 2023-05-12 PROCEDURE — 71250 CT THORAX DX C-: CPT | Mod: 26

## 2023-05-12 PROCEDURE — 71250 CT THORAX DX C-: CPT

## 2023-05-19 ENCOUNTER — TRANSCRIPTION ENCOUNTER (OUTPATIENT)
Age: 63
End: 2023-05-19

## 2023-05-25 ENCOUNTER — NON-APPOINTMENT (OUTPATIENT)
Age: 63
End: 2023-05-25

## 2023-06-02 ENCOUNTER — APPOINTMENT (OUTPATIENT)
Dept: PULMONOLOGY | Facility: CLINIC | Age: 63
End: 2023-06-02
Payer: COMMERCIAL

## 2023-06-02 PROCEDURE — 99443: CPT

## 2023-06-03 NOTE — HISTORY OF PRESENT ILLNESS
[Home] : at home, [unfilled] , at the time of the visit. [Verbal consent obtained from patient] : the patient, [unfilled] [Never] : never [TextBox_4] : 61 year old female with h/o Pulmonary MAC (treated before 2018 for around 1 and half year with ethambutol and azithromycin. Patient developed side effects from rifampicin, hence it was d/c). Patient was followed by Dr. Guillen previously and she came today to establish care. \par Patient is using VEST, Aerobica and hypertonic saline for airway clearance. Patient is c/o more cough and expectoration then before. She is also feeling slightly more SOB than usual. \par \par 12/15/21:\par Patient was treated with Levofloxacin for pseudomonas in the past. She feels much better. Sensitivity for MAC is still pending. \par \par 2/16/22:\par Patient remains symptomatic. Denies cough or expectoration. Patient had CT chest in Nov 2021 for persistent cough. \par \par 8/17/22:\par No exacerbation since last visit. Complaint with Aerobika, hypertonic saline and VEST. \par \par 3/22/23:\par Patient stopped Breo after last visit. Used albuterol for 2-3 times since last visit. She was recently diagnosed as COVID and started to have wheezing again.  \par \par 4/26/23:\par Patient has restarted Breo. She had episode of chest pain which required hospitalization. Etiology was not clear. Symptoms resolved. Denies change in cough or expectoration. \par \par 6/2/23:\par TTM to discuss the results of CT chest.  [ESS] : 2

## 2023-06-03 NOTE — DISCUSSION/SUMMARY
[FreeTextEntry1] : 61 year old female with h/o MAC (s/p treatment with ethambutol and azithromycin for 1 and half year in the past) with persistent sputum positive for MAC with h/o pseudomonas in sputum\par \par Review:\par Radiology:\par - CT chest (6/23): Overall increase in small and large airway disease worsening with foci of parenchymal opacities in RLL\par - CT chest (11/21):Mild interval worsening of patchy airspace and branching "tree in bud" opacities, most prominent in the right upper and right middle lobes, likely infectious or inflammatory in etiology. Persistent RML and Lingula bronchiectasis (no change) \par \par - CT chest (8/21): Bilateral RML, lingula bronchiectasis (stable). New focus of small airway infection in the peripheral upper lobe with tree in bud nodules with slightly more focal/dominant central nodule measuring 7 mm. \par - CT chest (2019): RML and lingula bronchiectasis\par \par Lung function test:\par - PFT (8 /22): FEV1 66, FEV1/FVC 71, TLC 78, DLCO 67\par - Six minute walk test (8/22): 94% on RA, HR increased from 68 to 104/minute with exertion. \par - PFT (8 /21 ): FEV1 71, FEV1/FVC 73, TLC 76, DLCO 69, Rev 2%\par - 6 minute walk test (8/21): 96% on RA with ambulation\par \par Microbiology:\par - Sputum culture results (7/21):\par AFB : MAC (sensitivity: Sensitive to rifampin and ethambutol, synergistic (ethambutol and rifampin). 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 Clarithromycin VENU 16 (intermediate sen). Sensitive to rifampin and ethambutol, synergistic (ethambutol and rifampin). \par - Repeat CT chest in May 2023 showed worsening bronchiectasis and tree in bud opacities\par - Persistent cough and expectoration. \par - No significant change in PFT.\par Plan:\par - Continue Breo and continue as needed albuterol.\par - Patient is not using VEST because of risk of fracture\par - Continue  Aerobica and hypertonic saline for airway clearance\par - CT chest showed worsening parenchymal changes and she has persistent cough and expectoration. Plan to get repeat sputum for culture\par - She will need appointment with Dr. Delgado// [facility] for restarting treatment for MAC.\par \par (2) Allergic Rhinitis:\par - Continue Flonase nasal spray\par  \par

## 2023-06-03 NOTE — REVIEW OF SYSTEMS
[Fever] : no fever [Chills] : no chills [Dry Eyes] : no dry eyes [Eye Irritation] : no eye irritation [Cough] : cough [SOB on Exertion] : sob on exertion [Sputum] : sputum [Chest Discomfort] : no chest discomfort [Orthopnea] : no orthopnea [Hay Fever] : no hay fever [Nasal Discharge] : no nasal discharge [GERD] : no gerd [Diabetes] : no diabetes [Thyroid Problem] : no thyroid problem

## 2023-06-05 ENCOUNTER — FORM ENCOUNTER (OUTPATIENT)
Age: 63
End: 2023-06-05

## 2023-06-06 ENCOUNTER — LABORATORY RESULT (OUTPATIENT)
Age: 63
End: 2023-06-06

## 2023-06-07 ENCOUNTER — NON-APPOINTMENT (OUTPATIENT)
Age: 63
End: 2023-06-07

## 2023-06-09 ENCOUNTER — APPOINTMENT (OUTPATIENT)
Dept: INFECTIOUS DISEASE | Facility: CLINIC | Age: 63
End: 2023-06-09
Payer: COMMERCIAL

## 2023-06-09 ENCOUNTER — NON-APPOINTMENT (OUTPATIENT)
Age: 63
End: 2023-06-09

## 2023-06-09 VITALS
BODY MASS INDEX: 18.64 KG/M2 | SYSTOLIC BLOOD PRESSURE: 145 MMHG | TEMPERATURE: 97.7 F | WEIGHT: 116 LBS | HEIGHT: 66 IN | OXYGEN SATURATION: 96 % | DIASTOLIC BLOOD PRESSURE: 76 MMHG | HEART RATE: 75 BPM

## 2023-06-09 PROCEDURE — 99205 OFFICE O/P NEW HI 60 MIN: CPT

## 2023-06-09 RX ORDER — MONTELUKAST 10 MG/1
10 TABLET, FILM COATED ORAL
Qty: 1 | Refills: 0 | Status: COMPLETED | COMMUNITY
Start: 2020-04-29 | End: 2023-06-09

## 2023-06-09 RX ORDER — LEVOFLOXACIN 750 MG/1
750 TABLET, FILM COATED ORAL DAILY
Qty: 14 | Refills: 0 | Status: COMPLETED | COMMUNITY
Start: 2021-11-29 | End: 2023-06-09

## 2023-06-09 RX ORDER — LEVOFLOXACIN 750 MG/1
750 TABLET, FILM COATED ORAL DAILY
Qty: 14 | Refills: 0 | Status: COMPLETED | COMMUNITY
Start: 2022-08-17 | End: 2023-06-09

## 2023-06-09 RX ORDER — BENZONATATE 100 MG/1
100 CAPSULE ORAL 3 TIMES DAILY
Qty: 90 | Refills: 1 | Status: COMPLETED | COMMUNITY
Start: 2021-11-17 | End: 2023-06-09

## 2023-06-09 RX ORDER — FLUTICASONE PROPIONATE 50 UG/1
50 SPRAY, METERED NASAL TWICE DAILY
Qty: 1 | Refills: 5 | Status: COMPLETED | COMMUNITY
Start: 2022-08-17 | End: 2023-06-09

## 2023-06-09 RX ORDER — ALBUTEROL SULFATE 90 UG/1
108 (90 BASE) INHALANT RESPIRATORY (INHALATION)
Qty: 1 | Refills: 5 | Status: COMPLETED | COMMUNITY
Start: 2022-08-17 | End: 2023-06-09

## 2023-06-09 NOTE — HISTORY OF PRESENT ILLNESS
[FreeTextEntry1] : 61F h/o bronchiectasis, pulmonary MAC infection s/p ETB/Azithro ~ 1 yr around 2011 p/w management of pulmonary MAC infection.  Patient was referred to me by Dr. De La O (pulm).\par \par She started to get repeated PNA about 20 yrs ago.  She was treated repeatedly without improvement.  She got multiple courses of abx and got admitted multiple times.  She was first diagnosed with MAC pulmonary infection 12 years ago for the first time while admitted at [facility] by ID physician.  She was treated with ETB/Azithro for 9 months, then RIF was added.  She had very foggy brain on RIF, was on three drug combo for 3-4 months, then treatment was discontinued.  She was fine for a while, then developed chronic cough, SOB, so she was referred to Dr. Guillen in 2018.  She was started on different inhaler regimen which significantly improved her symptoms.  She is on Breos, inh saline.  She used to use VEST but she was diagnosed with osteoporosis and got 2 rib fracture so she stopped using.  She was referred to Dr. De La O in 2021 Bronchiectasis center.  Her symptoms have been intermittently waxing/waning.  Last hospital admission was around 2018 when she had bacterial pneumonia.  Last abx treatment for PNA was around 2018 with Dr. Rowley as outpatient.  \par \par When her symptom is well controlled, her baseline symptom is intermittent cough, and productive cough overnight but no SOB.  Recently she noticed more productive cough than before but she thinks she can clear it and can sleep well after.  Denied SOB.  She can walk half a mile without getting SOB and doesn't have any issue carrying daily activities.  Denied fever/chills, hemoptysis, night sweats, weight loss.  CT chest 5/12 showed overall increased small and large airway disease with foci of parenchymal opacities in right lower lobe, probably increased CHALINO.  Repeat sputum culture sent on 6/6 - result pending.  She was referred to ID to see if MAC treatment should be initiated or not. 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

## 2023-06-09 NOTE — PHYSICAL EXAM
[General Appearance - Alert] : alert [General Appearance - In No Acute Distress] : in no acute distress [Sclera] : the sclera and conjunctiva were normal [Outer Ear] : the ears and nose were normal in appearance [Neck Appearance] : the appearance of the neck was normal [] : no respiratory distress [FreeTextEntry1] : crackles at b/l bases  [Heart Rate And Rhythm] : heart rate was normal and rhythm regular [Heart Sounds] : normal S1 and S2 [Bowel Sounds] : normal bowel sounds [Abdomen Soft] : soft [Abdomen Tenderness] : non-tender

## 2023-06-09 NOTE — DATA REVIEWED
[FreeTextEntry1] : ## MICRO ##\par \par 6/6/23 Sputum culture: NRF\par 6/6/23 Sputum fungal culture: p\par 6/6/23 Sputum AFB culture: p\par \par 11/29/21 Sputum culture: few Aspergillus fumigatus\par 11/29/21 Sputum Fungal culture: few Aspergillus fumigatus\par 11/29/21 Sputum AFB culture: MAC\par \par 11/17/21 Sputum culture: NRF\par 11/17/21 Sputum Fungal culture: few Aspergillus fumigatus\par 11/17/21 Sputum AFB culture: MAC\par \par 7/27/21 Sputum culture: NRF\par 7/27/21 Sputum Fungal culture: few Aspergillus fumigatus\par 7/27/21 Sputum AFB culture: MAC\par \par 6/26/21 Sputum AFB culture: MAC (RIF <0.12 S (combo assay), ETB <1.25 S (combo assay), EMB/RIF combo SYN, Amik S, Cipro R, Clarithro I, Clofazemine 0.12, doxy >8, linezolid R, isidoro >8, Moxi R, Bact 2/38\par \par ## IMAGING ##\par 5/25/23 CT chest\par FINDINGS:\par \par LUNGS AND AIRWAYS: Interval changes as follows:\par *  Right upper lobe decreased large and small airway disease with decreased nodular and tree-in-bud opacities\par *  Right middle lobe again cystic bronchiectasis with atelectasis and slightly increased bronchiectasis opacification\par *  Right lower lobe new and increased nodules and opacities (6, 249) and increased opacification of medial segment bronchiectasis\par *  Left upper lobe increased large and small airway disease (6, 130-135) and increased opacification of left lingual cystic bronchiectasis\par *  Left lower lobe unchanged foci of small and large airway disease\par PLEURA: No pleural effusion.\par MEDIASTINUM AND TINA: No lymphadenopathy.\par VESSELS: Within normal limits.\par HEART: Heart size is normal. No pericardial effusion.\par CHEST WALL AND LOWER NECK: Within normal limits.\par VISUALIZED UPPER ABDOMEN: Within normal limits.\par BONES: No suspicious lesion. Since November 1, 2021, new chronic compression fractures T8 and L1, no osseous retropulsion.\par \par IMPRESSION:\par 1. Since November 1, 2021, overall increased small and large airway disease with foci of parenchymal opacities in right lower lobe, probably increased CHALINO.\par

## 2023-06-09 NOTE — ASSESSMENT
[FreeTextEntry1] : 61F h/o bronchiectasis, pulmonary MAC infection s/p ETB/Azithro ~ 1 yr around 2011 p/w management of pulmonary MAC infection.  Patient was referred to me by Dr. De La O (pulm).\par \par Her current symptom is intermittent cough which wax and wane, and she doesn't think it bothers her more than usual.  She doesn't have any MORALES.  I discussed the purpose of treatment of MAC infection, which is to alleviate symptoms and improve QOL.  Complete cure will be very difficult given underlying bronchiectasis and constant exposure to environmental MAC. Since her last positive AFB culture is 2021, will need to wait to see if 6/6 AFB culture grows.  If AFB culture grows and she becomes symptomatic, then she will require treatment.  She is in agreement with the plan.\par \par - f/u 6/6 sputum AFB culture\par - RTC 2 months \par - patient to call me should she experiences worsening symptoms \par - f/u Dr. De La O

## 2023-06-13 ENCOUNTER — NON-APPOINTMENT (OUTPATIENT)
Age: 63
End: 2023-06-13

## 2023-06-14 LAB — BACTERIA SPT CULT: NORMAL

## 2023-06-15 ENCOUNTER — NON-APPOINTMENT (OUTPATIENT)
Age: 63
End: 2023-06-15

## 2023-06-29 ENCOUNTER — NON-APPOINTMENT (OUTPATIENT)
Age: 63
End: 2023-06-29

## 2023-07-06 ENCOUNTER — NON-APPOINTMENT (OUTPATIENT)
Age: 63
End: 2023-07-06

## 2023-07-06 LAB — FUNGUS SPT CULT: ABNORMAL

## 2023-07-13 ENCOUNTER — NON-APPOINTMENT (OUTPATIENT)
Age: 63
End: 2023-07-13

## 2023-08-18 LAB — ACID FAST STN SPT: ABNORMAL

## 2023-08-22 ENCOUNTER — OUTPATIENT (OUTPATIENT)
Dept: OUTPATIENT SERVICES | Facility: HOSPITAL | Age: 63
LOS: 1 days | End: 2023-08-22

## 2023-08-22 ENCOUNTER — APPOINTMENT (OUTPATIENT)
Dept: CT IMAGING | Facility: CLINIC | Age: 63
End: 2023-08-22
Payer: COMMERCIAL

## 2023-08-22 DIAGNOSIS — J47.9 BRONCHIECTASIS, UNCOMPLICATED: ICD-10-CM

## 2023-08-22 DIAGNOSIS — J98.4 OTHER DISORDERS OF LUNG: ICD-10-CM

## 2023-08-22 DIAGNOSIS — R91.8 OTHER NONSPECIFIC ABNORMAL FINDING OF LUNG FIELD: ICD-10-CM

## 2023-08-22 PROCEDURE — 71250 CT THORAX DX C-: CPT | Mod: 26

## 2023-09-07 ENCOUNTER — APPOINTMENT (OUTPATIENT)
Dept: INFECTIOUS DISEASE | Facility: CLINIC | Age: 63
End: 2023-09-07
Payer: COMMERCIAL

## 2023-09-07 ENCOUNTER — NON-APPOINTMENT (OUTPATIENT)
Age: 63
End: 2023-09-07

## 2023-09-07 VITALS
BODY MASS INDEX: 18.64 KG/M2 | DIASTOLIC BLOOD PRESSURE: 72 MMHG | SYSTOLIC BLOOD PRESSURE: 125 MMHG | WEIGHT: 116 LBS | HEIGHT: 66 IN | TEMPERATURE: 97.5 F | OXYGEN SATURATION: 96 % | HEART RATE: 82 BPM

## 2023-09-07 PROCEDURE — 99215 OFFICE O/P EST HI 40 MIN: CPT

## 2023-09-08 LAB
ALBUMIN SERPL ELPH-MCNC: 4.5 G/DL
ALP BLD-CCNC: 61 U/L
ALT SERPL-CCNC: 18 U/L
ANION GAP SERPL CALC-SCNC: 10 MMOL/L
AST SERPL-CCNC: 23 U/L
BASOPHILS # BLD AUTO: 0.08 K/UL
BASOPHILS NFR BLD AUTO: 1.2 %
BILIRUB SERPL-MCNC: 0.4 MG/DL
BUN SERPL-MCNC: 11 MG/DL
CALCIUM SERPL-MCNC: 9.8 MG/DL
CHLORIDE SERPL-SCNC: 104 MMOL/L
CO2 SERPL-SCNC: 26 MMOL/L
CREAT SERPL-MCNC: 0.81 MG/DL
EGFR: 83 ML/MIN/1.73M2
EOSINOPHIL # BLD AUTO: 0.11 K/UL
EOSINOPHIL NFR BLD AUTO: 1.6 %
GLUCOSE SERPL-MCNC: 89 MG/DL
HCT VFR BLD CALC: 41.8 %
HGB BLD-MCNC: 13.5 G/DL
IMM GRANULOCYTES NFR BLD AUTO: 0.1 %
LYMPHOCYTES # BLD AUTO: 2.01 K/UL
LYMPHOCYTES NFR BLD AUTO: 29.4 %
MAN DIFF?: NORMAL
MCHC RBC-ENTMCNC: 29.9 PG
MCHC RBC-ENTMCNC: 32.3 GM/DL
MCV RBC AUTO: 92.5 FL
MONOCYTES # BLD AUTO: 0.53 K/UL
MONOCYTES NFR BLD AUTO: 7.8 %
NEUTROPHILS # BLD AUTO: 4.09 K/UL
NEUTROPHILS NFR BLD AUTO: 59.9 %
PLATELET # BLD AUTO: 285 K/UL
POTASSIUM SERPL-SCNC: 4.9 MMOL/L
PROT SERPL-MCNC: 7.1 G/DL
RBC # BLD: 4.52 M/UL
RBC # FLD: 13.2 %
SODIUM SERPL-SCNC: 140 MMOL/L
WBC # FLD AUTO: 6.83 K/UL

## 2023-09-08 NOTE — HISTORY OF PRESENT ILLNESS
[FreeTextEntry1] : 61F h/o bronchiectasis, pulmonary MAC infection s/p ETB/Azithro ~ 1 yr around 2011 p/w management of pulmonary MAC infection.   She started to get repeated PNA about 20 yrs ago.  She was treated repeatedly without improvement.  She got multiple courses of abx and got admitted multiple times.  She was first diagnosed with MAC pulmonary infection 12 years ago for the first time while admitted at [facility] by ID physician.  She was treated with ETB/Azithro for 9 months, then RIF was added.  She had very foggy brain on RIF, was on three drug combo for 3-4 months, then treatment was discontinued.  She was fine for a while, then developed chronic cough, SOB, so she was referred to Dr. Guillen in 2018.  She was started on different inhaler regimen which significantly improved her symptoms.  She is on Breos, inh saline.  She used to use VEST but she was diagnosed with osteoporosis and got 2 rib fracture so she stopped using.  She was referred to Dr. De La O in 2021 Bronchiectasis center.  Her symptoms have been intermittently waxing/waning.  Last hospital admission was around 2018 when she had bacterial pneumonia.  Last abx treatment for PNA was around 2018 with Dr. Rowley as outpatient.    When her symptom is well controlled, her baseline symptom is intermittent cough, and productive cough overnight but no SOB.  Recently she noticed more productive cough than before but she thinks she can clear it and can sleep well after.  Denied SOB.  She can walk half a mile without getting SOB and doesn't have any issue carrying daily activities.  Denied fever/chills, hemoptysis, night sweats, weight loss.  CT chest 5/12 showed overall increased small and large airway disease with foci of parenchymal opacities in right lower lobe, probably increased CHALINO.  Repeat sputum culture sent on 6/6.  She was referred to ID to see if MAC treatment should be initiated or not.  She saw me for the first time on 6/9.  At that time her symptom was at baseline and she was doing well.  6/6 culture was still ngtd.  No treatment was indicated at that time.  Since then patient developed worsening cough.  Today patient reports constant dry cough which bothers her a lot.  She gets MORALES when she takes stairs.  Denied fever/chills, night sweats, weight loss.  6/6 AFB sputum culture grew MAC as well.   MAC susceptibility back - S to clarithromycin.    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

## 2023-09-08 NOTE — DATA REVIEWED
[FreeTextEntry1] : ## MICRO ##  6/6/23 Sputum culture: NRF 6/6/23 Sputum fungal culture: p 6/6/23 Sputum AFB culture: MAC (S to amik, clarithro)  11/29/21 Sputum culture: few Aspergillus fumigatus 11/29/21 Sputum Fungal culture: few Aspergillus fumigatus 11/29/21 Sputum AFB culture: MAC  11/17/21 Sputum culture: NRF 11/17/21 Sputum Fungal culture: few Aspergillus fumigatus 11/17/21 Sputum AFB culture: MAC  7/27/21 Sputum culture: NRF 7/27/21 Sputum Fungal culture: few Aspergillus fumigatus 7/27/21 Sputum AFB culture: MAC  6/26/21 Sputum AFB culture: MAC (RIF <0.12 S (combo assay), ETB <1.25 S (combo assay), EMB/RIF combo SYN, Amik S, Cipro R, Clarithro I, Clofazemine 0.12, doxy >8, linezolid R, isidoro >8, Moxi R, Bact 2/38  ## IMAGING ## 8/18/23 CT chest Impression: Since 5/12/2023, there is a waxing and waning appearance of infectious airways disease, with improvement in the patchy areas of groundglass opacity and consolidation that were previously present in the right lower lobe and left upper lobe and worsening of groundglass opacities in the right upper lobe and tree-in-bud opacities in the left lower lobe.  5/25/23 CT chest FINDINGS:  LUNGS AND AIRWAYS: Interval changes as follows: *  Right upper lobe decreased large and small airway disease with decreased nodular and tree-in-bud opacities *  Right middle lobe again cystic bronchiectasis with atelectasis and slightly increased bronchiectasis opacification *  Right lower lobe new and increased nodules and opacities (6, 249) and increased opacification of medial segment bronchiectasis *  Left upper lobe increased large and small airway disease (6, 130-135) and increased opacification of left lingual cystic bronchiectasis *  Left lower lobe unchanged foci of small and large airway disease PLEURA: No pleural effusion. MEDIASTINUM AND TINA: No lymphadenopathy. VESSELS: Within normal limits. HEART: Heart size is normal. No pericardial effusion. CHEST WALL AND LOWER NECK: Within normal limits. VISUALIZED UPPER ABDOMEN: Within normal limits. BONES: No suspicious lesion. Since November 1, 2021, new chronic compression fractures T8 and L1, no osseous retropulsion.  IMPRESSION: 1. Since November 1, 2021, overall increased small and large airway disease with foci of parenchymal opacities in right lower lobe, probably increased CHALINO.

## 2023-09-08 NOTE — PHYSICAL EXAM
[General Appearance - Alert] : alert [General Appearance - In No Acute Distress] : in no acute distress [Sclera] : the sclera and conjunctiva were normal [Outer Ear] : the ears and nose were normal in appearance [Neck Appearance] : the appearance of the neck was normal [] : no respiratory distress [Heart Rate And Rhythm] : heart rate was normal and rhythm regular [Heart Sounds] : normal S1 and S2 [Bowel Sounds] : normal bowel sounds [Abdomen Soft] : soft [Abdomen Tenderness] : non-tender [Auscultation Breath Sounds / Voice Sounds] : lungs were clear to auscultation bilaterally [FreeTextEntry1] : crackles at b/l bases

## 2023-09-08 NOTE — ASSESSMENT
[FreeTextEntry1] : 61F h/o bronchiectasis, pulmonary MAC infection s/p ETB/Azithro ~ 1 yr around 2011 p/w management of pulmonary MAC infection.    Patient is now symptomatic (constant dry cough) and AFB sputum culture grew MAC x 2.  CT chest result reviewed with Dr. Davila  - waxing and waning appearance, has a lot of nodules but no significant change from prior, no cavitation.  I think it is best that we start three PO drug combo therapy first to see how she responds, and if she doesn't improve in a few months then will consider adding IV amikacin.    - start Azithromycin 500mg PO daily - start Rifampin 600mg PO daily  - start Ethambutol 800mg PO daily (Wt 116lb = 52.6kg) - labs today - EKG QTc <400 today  - patient to see ophthalmology  - RTC 1 month - f/u Dr. Allen (pulm), need repeat AFB sputum culture during the visit

## 2023-09-28 ENCOUNTER — APPOINTMENT (OUTPATIENT)
Dept: INFECTIOUS DISEASE | Facility: CLINIC | Age: 63
End: 2023-09-28
Payer: COMMERCIAL

## 2023-09-28 VITALS
SYSTOLIC BLOOD PRESSURE: 150 MMHG | OXYGEN SATURATION: 96 % | HEART RATE: 80 BPM | HEIGHT: 66 IN | DIASTOLIC BLOOD PRESSURE: 79 MMHG | TEMPERATURE: 97 F | WEIGHT: 116.5 LBS | BODY MASS INDEX: 18.72 KG/M2

## 2023-09-28 PROCEDURE — 99214 OFFICE O/P EST MOD 30 MIN: CPT

## 2023-09-28 RX ORDER — FOLIC ACID 20 MG
CAPSULE ORAL
Refills: 0 | Status: ACTIVE | COMMUNITY

## 2023-09-28 RX ORDER — MULTIVIT-MIN/FOLIC/VIT K/LYCOP 400-300MCG
1000 TABLET ORAL
Refills: 0 | Status: ACTIVE | COMMUNITY

## 2023-09-28 RX ORDER — UBIDECARENONE/VIT E ACET 100MG-5
CAPSULE ORAL
Refills: 0 | Status: ACTIVE | COMMUNITY

## 2023-09-29 LAB
ALBUMIN SERPL ELPH-MCNC: 4.6 G/DL
ALP BLD-CCNC: 59 U/L
ALT SERPL-CCNC: 19 U/L
ANION GAP SERPL CALC-SCNC: 11 MMOL/L
AST SERPL-CCNC: 22 U/L
BASOPHILS # BLD AUTO: 0.1 K/UL
BASOPHILS NFR BLD AUTO: 2 %
BILIRUB SERPL-MCNC: 0.3 MG/DL
BUN SERPL-MCNC: 12 MG/DL
CALCIUM SERPL-MCNC: 9.6 MG/DL
CHLORIDE SERPL-SCNC: 104 MMOL/L
CO2 SERPL-SCNC: 25 MMOL/L
CREAT SERPL-MCNC: 0.76 MG/DL
EGFR: 89 ML/MIN/1.73M2
EOSINOPHIL # BLD AUTO: 0.26 K/UL
EOSINOPHIL NFR BLD AUTO: 5.3 %
GLUCOSE SERPL-MCNC: 69 MG/DL
HCT VFR BLD CALC: 42.8 %
HGB BLD-MCNC: 14.1 G/DL
IMM GRANULOCYTES NFR BLD AUTO: 0 %
LYMPHOCYTES # BLD AUTO: 1.59 K/UL
LYMPHOCYTES NFR BLD AUTO: 32.1 %
MAN DIFF?: NORMAL
MCHC RBC-ENTMCNC: 30.3 PG
MCHC RBC-ENTMCNC: 32.9 GM/DL
MCV RBC AUTO: 92 FL
MONOCYTES # BLD AUTO: 0.51 K/UL
MONOCYTES NFR BLD AUTO: 10.3 %
NEUTROPHILS # BLD AUTO: 2.49 K/UL
NEUTROPHILS NFR BLD AUTO: 50.3 %
PLATELET # BLD AUTO: 282 K/UL
POTASSIUM SERPL-SCNC: 4.2 MMOL/L
PROT SERPL-MCNC: 7.2 G/DL
RBC # BLD: 4.65 M/UL
RBC # FLD: 12.9 %
SODIUM SERPL-SCNC: 140 MMOL/L
WBC # FLD AUTO: 4.95 K/UL

## 2023-10-06 ENCOUNTER — RX RENEWAL (OUTPATIENT)
Age: 63
End: 2023-10-06

## 2023-10-26 ENCOUNTER — APPOINTMENT (OUTPATIENT)
Dept: PULMONOLOGY | Facility: CLINIC | Age: 63
End: 2023-10-26
Payer: COMMERCIAL

## 2023-10-26 VITALS
WEIGHT: 117 LBS | BODY MASS INDEX: 18.8 KG/M2 | SYSTOLIC BLOOD PRESSURE: 149 MMHG | HEART RATE: 82 BPM | DIASTOLIC BLOOD PRESSURE: 75 MMHG | RESPIRATION RATE: 12 BRPM | TEMPERATURE: 97.5 F | HEIGHT: 66 IN | OXYGEN SATURATION: 96 %

## 2023-10-26 PROCEDURE — 99214 OFFICE O/P EST MOD 30 MIN: CPT

## 2023-10-27 ENCOUNTER — NON-APPOINTMENT (OUTPATIENT)
Age: 63
End: 2023-10-27

## 2023-10-27 ENCOUNTER — APPOINTMENT (OUTPATIENT)
Dept: PULMONOLOGY | Facility: CLINIC | Age: 63
End: 2023-10-27

## 2023-10-27 LAB
ALBUMIN SERPL ELPH-MCNC: 4.2 G/DL
ALP BLD-CCNC: 60 U/L
ALT SERPL-CCNC: 22 U/L
ANION GAP SERPL CALC-SCNC: 11 MMOL/L
AST SERPL-CCNC: 26 U/L
BASOPHILS # BLD AUTO: 0.06 K/UL
BASOPHILS NFR BLD AUTO: 1.2 %
BILIRUB SERPL-MCNC: 0.3 MG/DL
BUN SERPL-MCNC: 11 MG/DL
CALCIUM SERPL-MCNC: 9.5 MG/DL
CHLORIDE SERPL-SCNC: 103 MMOL/L
CO2 SERPL-SCNC: 23 MMOL/L
CREAT SERPL-MCNC: 0.69 MG/DL
EGFR: 99 ML/MIN/1.73M2
EOSINOPHIL # BLD AUTO: 0.24 K/UL
EOSINOPHIL NFR BLD AUTO: 4.9 %
GLUCOSE SERPL-MCNC: 102 MG/DL
HCT VFR BLD CALC: 38.8 %
HGB BLD-MCNC: 13.4 G/DL
IMM GRANULOCYTES NFR BLD AUTO: 0.2 %
LYMPHOCYTES # BLD AUTO: 1.33 K/UL
LYMPHOCYTES NFR BLD AUTO: 27.4 %
MAN DIFF?: NORMAL
MCHC RBC-ENTMCNC: 31.1 PG
MCHC RBC-ENTMCNC: 34.5 GM/DL
MCV RBC AUTO: 90 FL
MONOCYTES # BLD AUTO: 0.45 K/UL
MONOCYTES NFR BLD AUTO: 9.3 %
NEUTROPHILS # BLD AUTO: 2.76 K/UL
NEUTROPHILS NFR BLD AUTO: 57 %
PLATELET # BLD AUTO: 267 K/UL
POTASSIUM SERPL-SCNC: 3.9 MMOL/L
PROT SERPL-MCNC: 6.8 G/DL
RBC # BLD: 4.31 M/UL
RBC # FLD: 12.4 %
SODIUM SERPL-SCNC: 137 MMOL/L
WBC # FLD AUTO: 4.85 K/UL

## 2023-11-16 ENCOUNTER — APPOINTMENT (OUTPATIENT)
Dept: INFECTIOUS DISEASE | Facility: CLINIC | Age: 63
End: 2023-11-16
Payer: COMMERCIAL

## 2023-11-16 VITALS
DIASTOLIC BLOOD PRESSURE: 80 MMHG | WEIGHT: 115.25 LBS | BODY MASS INDEX: 18.52 KG/M2 | SYSTOLIC BLOOD PRESSURE: 127 MMHG | HEART RATE: 84 BPM | HEIGHT: 66 IN | TEMPERATURE: 97.2 F | OXYGEN SATURATION: 98 %

## 2023-11-16 DIAGNOSIS — R06.02 SHORTNESS OF BREATH: ICD-10-CM

## 2023-11-16 PROCEDURE — 99214 OFFICE O/P EST MOD 30 MIN: CPT

## 2023-11-20 LAB
ALBUMIN SERPL ELPH-MCNC: 4.3 G/DL
ALP BLD-CCNC: 58 U/L
ALT SERPL-CCNC: 35 U/L
ANION GAP SERPL CALC-SCNC: 10 MMOL/L
AST SERPL-CCNC: 33 U/L
BACTERIA SPT CULT: ABNORMAL
BASOPHILS # BLD AUTO: 0.09 K/UL
BASOPHILS NFR BLD AUTO: 1.9 %
BILIRUB SERPL-MCNC: 0.4 MG/DL
BUN SERPL-MCNC: 11 MG/DL
CALCIUM SERPL-MCNC: 9.5 MG/DL
CHLORIDE SERPL-SCNC: 104 MMOL/L
CO2 SERPL-SCNC: 24 MMOL/L
CREAT SERPL-MCNC: 0.71 MG/DL
EGFR: 97 ML/MIN/1.73M2
EOSINOPHIL # BLD AUTO: 0.12 K/UL
EOSINOPHIL NFR BLD AUTO: 2.6 %
GLUCOSE SERPL-MCNC: 86 MG/DL
HCT VFR BLD CALC: 40.2 %
HGB BLD-MCNC: 13.6 G/DL
IMM GRANULOCYTES NFR BLD AUTO: 0.2 %
LYMPHOCYTES # BLD AUTO: 1.09 K/UL
LYMPHOCYTES NFR BLD AUTO: 23.6 %
MAN DIFF?: NORMAL
MCHC RBC-ENTMCNC: 30.8 PG
MCHC RBC-ENTMCNC: 33.8 GM/DL
MCV RBC AUTO: 91 FL
MONOCYTES # BLD AUTO: 0.43 K/UL
MONOCYTES NFR BLD AUTO: 9.3 %
NEUTROPHILS # BLD AUTO: 2.88 K/UL
NEUTROPHILS NFR BLD AUTO: 62.4 %
PLATELET # BLD AUTO: 248 K/UL
POTASSIUM SERPL-SCNC: 4.3 MMOL/L
PROT SERPL-MCNC: 7.1 G/DL
RBC # BLD: 4.42 M/UL
RBC # FLD: 12.9 %
SODIUM SERPL-SCNC: 139 MMOL/L
WBC # FLD AUTO: 4.62 K/UL

## 2023-11-24 ENCOUNTER — NON-APPOINTMENT (OUTPATIENT)
Age: 63
End: 2023-11-24

## 2023-11-26 ENCOUNTER — NON-APPOINTMENT (OUTPATIENT)
Age: 63
End: 2023-11-26

## 2023-11-27 ENCOUNTER — NON-APPOINTMENT (OUTPATIENT)
Age: 63
End: 2023-11-27

## 2023-12-02 ENCOUNTER — NON-APPOINTMENT (OUTPATIENT)
Age: 63
End: 2023-12-02

## 2023-12-08 ENCOUNTER — TRANSCRIPTION ENCOUNTER (OUTPATIENT)
Age: 63
End: 2023-12-08

## 2023-12-16 ENCOUNTER — NON-APPOINTMENT (OUTPATIENT)
Age: 63
End: 2023-12-16

## 2023-12-20 LAB — FUNGUS SPT CULT: ABNORMAL

## 2023-12-21 ENCOUNTER — APPOINTMENT (OUTPATIENT)
Dept: INFECTIOUS DISEASE | Facility: CLINIC | Age: 63
End: 2023-12-21
Payer: COMMERCIAL

## 2023-12-21 VITALS
HEIGHT: 65 IN | TEMPERATURE: 97.6 F | BODY MASS INDEX: 19.33 KG/M2 | WEIGHT: 116 LBS | DIASTOLIC BLOOD PRESSURE: 79 MMHG | OXYGEN SATURATION: 96 % | HEART RATE: 96 BPM | SYSTOLIC BLOOD PRESSURE: 121 MMHG

## 2023-12-21 PROCEDURE — 99214 OFFICE O/P EST MOD 30 MIN: CPT

## 2023-12-21 NOTE — ASSESSMENT
[FreeTextEntry1] : 61F h/o bronchiectasis, pulmonary MAC infection s/p ETB/Azithro ~ 1 yr around 2011 now on RIF/Azithro/ETB (9/8/23-) p/w management of pulmonary MAC infection.  Patient is tolerating all meds.  Her worsening cough recently is in setting of URI and now improving.  If patient experiences any worsening cough, then will consider adding abx to cover sputum culture from 11/16 to treat bacterial superinfection.    - cont Azithromycin 500mg PO daily - cont Rifampin 600mg PO daily - cont Ethambutol 800mg PO daily (Wt 116lb = 52.6kg) - labs today - f/u Dr. Allen (pul) - Repeat AFB sputum culture after 12/2023 (after being on treatment for at least 3 months) - RTC in1 month

## 2023-12-21 NOTE — HISTORY OF PRESENT ILLNESS
[FreeTextEntry1] : 61F h/o bronchiectasis, pulmonary MAC infection s/p ETB/Azithro ~ 1 yr around 2011 now on RIF/Azithro/ETB (9/8/23-) p/w management of pulmonary MAC infection.   She started to get repeated PNA about 20 yrs ago.  She was treated repeatedly without improvement.  She got multiple courses of abx and got admitted multiple times.  She was first diagnosed with MAC pulmonary infection 12 years ago for the first time while admitted at [facility] by ID physician.  She was treated with ETB/Azithro for 9 months, then RIF was added.  She had very foggy brain on RIF, was on three drug combo for 3-4 months, then treatment was discontinued.  She was fine for a while, then developed chronic cough, SOB, so she was referred to Dr. Guillen in 2018.  She was started on different inhaler regimen which significantly improved her symptoms.  She is on Breos, inh saline.  She used to use VEST but she was diagnosed with osteoporosis and got 2 rib fracture so she stopped using.  She was referred to Dr. De La O in 2021 Bronchiectasis center.  Her symptoms have been intermittently waxing/waning.  Last hospital admission was around 2018 when she had bacterial pneumonia.  Last abx treatment for PNA was around 2018 with Dr. Rowley as outpatient.    When her symptom is well controlled, her baseline symptom is intermittent cough, and productive cough overnight but no SOB.  Recently she noticed more productive cough than before but she thinks she can clear it and can sleep well after.  Denied SOB.  She can walk half a mile without getting SOB and doesn't have any issue carrying daily activities.  Denied fever/chills, hemoptysis, night sweats, weight loss.  CT chest 5/12 showed overall increased small and large airway disease with foci of parenchymal opacities in right lower lobe, probably increased CHALINO.  Repeat sputum culture sent on 6/6.  She was referred to ID to see if MAC treatment should be initiated or not.  She saw me for the first time on 6/9.  At that time her symptom was at baseline and she was doing well.  6/6 culture was still ngtd.  No treatment was indicated at that time.  Since then patient developed worsening cough.  6/6 AFB sputum culture grew MAC as well.   MAC susceptibility back - S to clarithromycin.   She saw me on 9/7/23 and RIF/Azithro/ETB treatment was initiated on 9/8.  During the follow up on 11/16, she reported improving cough so amikacin was not added.  Today she reports that she got URI 2 weeks ago which caused worsening cough.  She was having productive cough until two days ago then improving.   Less appetite due to coughing but she can sleep through the night and no cough overnight.  Denied fever/chills, SOB.  She got repeat sputum culture on 11/16 - bacterial culture grew P. mirabilis and NRF, AFB culture ngtd.    She saw ophthalmology and was told no issue on eye exam.  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

## 2023-12-21 NOTE — DATA REVIEWED
[FreeTextEntry1] : ## MICRO ## 11/22/23 Sputum fungal: candida 11/22/23 Sputum AFB: ngtd 11/22/23 Sputum: P. mirabilis, NRF  6/6/23 Sputum culture: NRF 6/6/23 Sputum fungal culture: p 6/6/23 Sputum AFB culture: MAC (S to amik, clarithro)  11/29/21 Sputum culture: few Aspergillus fumigatus 11/29/21 Sputum Fungal culture: few Aspergillus fumigatus 11/29/21 Sputum AFB culture: MAC  11/17/21 Sputum culture: NRF 11/17/21 Sputum Fungal culture: few Aspergillus fumigatus 11/17/21 Sputum AFB culture: MAC  7/27/21 Sputum culture: NRF 7/27/21 Sputum Fungal culture: few Aspergillus fumigatus 7/27/21 Sputum AFB culture: MAC  6/26/21 Sputum AFB culture: MAC (RIF <0.12 S (combo assay), ETB <1.25 S (combo assay), EMB/RIF combo SYN, Amik S, Cipro R, Clarithro I, Clofazemine 0.12, doxy >8, linezolid R, isidoro >8, Moxi R, Bact 2/38  ## IMAGING ## 8/18/23 CT chest Impression: Since 5/12/2023, there is a waxing and waning appearance of infectious airways disease, with improvement in the patchy areas of groundglass opacity and consolidation that were previously present in the right lower lobe and left upper lobe and worsening of groundglass opacities in the right upper lobe and tree-in-bud opacities in the left lower lobe.  5/25/23 CT chest FINDINGS:  LUNGS AND AIRWAYS: Interval changes as follows: *  Right upper lobe decreased large and small airway disease with decreased nodular and tree-in-bud opacities *  Right middle lobe again cystic bronchiectasis with atelectasis and slightly increased bronchiectasis opacification *  Right lower lobe new and increased nodules and opacities (6, 249) and increased opacification of medial segment bronchiectasis *  Left upper lobe increased large and small airway disease (6, 130-135) and increased opacification of left lingual cystic bronchiectasis *  Left lower lobe unchanged foci of small and large airway disease PLEURA: No pleural effusion. MEDIASTINUM AND TINA: No lymphadenopathy. VESSELS: Within normal limits. HEART: Heart size is normal. No pericardial effusion. CHEST WALL AND LOWER NECK: Within normal limits. VISUALIZED UPPER ABDOMEN: Within normal limits. BONES: No suspicious lesion. Since November 1, 2021, new chronic compression fractures T8 and L1, no osseous retropulsion.  IMPRESSION: 1. Since November 1, 2021, overall increased small and large airway disease with foci of parenchymal opacities in right lower lobe, probably increased CHALINO.

## 2023-12-21 NOTE — PHYSICAL EXAM
[General Appearance - Alert] : alert [General Appearance - In No Acute Distress] : in no acute distress [Sclera] : the sclera and conjunctiva were normal [Outer Ear] : the ears and nose were normal in appearance [Neck Appearance] : the appearance of the neck was normal [] : no respiratory distress [Auscultation Breath Sounds / Voice Sounds] : lungs were clear to auscultation bilaterally [Heart Rate And Rhythm] : heart rate was normal and rhythm regular [Heart Sounds] : normal S1 and S2 [Bowel Sounds] : normal bowel sounds [Abdomen Soft] : soft [Abdomen Tenderness] : non-tender [FreeTextEntry1] : crackles at b/l bases

## 2023-12-22 LAB
ALBUMIN SERPL ELPH-MCNC: 4.5 G/DL
ALP BLD-CCNC: 61 U/L
ALT SERPL-CCNC: 20 U/L
ANION GAP SERPL CALC-SCNC: 14 MMOL/L
AST SERPL-CCNC: 20 U/L
BASOPHILS # BLD AUTO: 0.06 K/UL
BASOPHILS NFR BLD AUTO: 0.7 %
BILIRUB SERPL-MCNC: 0.2 MG/DL
BUN SERPL-MCNC: 11 MG/DL
CALCIUM SERPL-MCNC: 9.2 MG/DL
CHLORIDE SERPL-SCNC: 103 MMOL/L
CO2 SERPL-SCNC: 20 MMOL/L
CREAT SERPL-MCNC: 0.63 MG/DL
EGFR: 101 ML/MIN/1.73M2
EOSINOPHIL # BLD AUTO: 0.13 K/UL
EOSINOPHIL NFR BLD AUTO: 1.5 %
GLUCOSE SERPL-MCNC: 79 MG/DL
HCT VFR BLD CALC: 41.4 %
HGB BLD-MCNC: 14.1 G/DL
IMM GRANULOCYTES NFR BLD AUTO: 0.2 %
LYMPHOCYTES # BLD AUTO: 1.43 K/UL
LYMPHOCYTES NFR BLD AUTO: 16.7 %
MAN DIFF?: NORMAL
MCHC RBC-ENTMCNC: 31.3 PG
MCHC RBC-ENTMCNC: 34.1 GM/DL
MCV RBC AUTO: 92 FL
MONOCYTES # BLD AUTO: 0.76 K/UL
MONOCYTES NFR BLD AUTO: 8.9 %
NEUTROPHILS # BLD AUTO: 6.16 K/UL
NEUTROPHILS NFR BLD AUTO: 72 %
PLATELET # BLD AUTO: 287 K/UL
POTASSIUM SERPL-SCNC: 4 MMOL/L
PROT SERPL-MCNC: 7.2 G/DL
RBC # BLD: 4.5 M/UL
RBC # FLD: 12 %
SODIUM SERPL-SCNC: 137 MMOL/L
WBC # FLD AUTO: 8.56 K/UL

## 2023-12-26 ENCOUNTER — NON-APPOINTMENT (OUTPATIENT)
Age: 63
End: 2023-12-26

## 2024-01-03 LAB — ACID FAST STN SPT: NORMAL

## 2024-01-18 ENCOUNTER — APPOINTMENT (OUTPATIENT)
Dept: PULMONOLOGY | Facility: CLINIC | Age: 64
End: 2024-01-18
Payer: COMMERCIAL

## 2024-01-18 VITALS
RESPIRATION RATE: 12 BRPM | HEIGHT: 65 IN | SYSTOLIC BLOOD PRESSURE: 152 MMHG | TEMPERATURE: 98.1 F | BODY MASS INDEX: 19.33 KG/M2 | HEART RATE: 94 BPM | WEIGHT: 116 LBS | OXYGEN SATURATION: 96 % | DIASTOLIC BLOOD PRESSURE: 78 MMHG

## 2024-01-18 PROCEDURE — 99214 OFFICE O/P EST MOD 30 MIN: CPT

## 2024-01-18 RX ORDER — FLUTICASONE FUROATE AND VILANTEROL TRIFENATATE 200; 25 UG/1; UG/1
200-25 POWDER RESPIRATORY (INHALATION)
Qty: 1 | Refills: 5 | Status: ACTIVE | COMMUNITY
Start: 2019-10-04 | End: 1900-01-01

## 2024-01-18 RX ORDER — SODIUM CHLORIDE SOLN NEBU 7% 7 %
7 NEBU SOLN INHALATION
Qty: 3 | Refills: 3 | Status: ACTIVE | COMMUNITY
Start: 2018-01-30 | End: 1900-01-01

## 2024-01-19 NOTE — PHYSICAL EXAM
[No Acute Distress] : no acute distress [No Resp Distress] : no resp distress [Clear to Auscultation Bilaterally] : clear to auscultation bilaterally [Normal Gait] : normal gait [Oriented x3] : oriented x3 [Normal Affect] : normal affect [Normal Appearance] : normal appearance

## 2024-01-19 NOTE — HISTORY OF PRESENT ILLNESS
[Never] : never [TextBox_4] :  61-year-old female with h/o Pulmonary MAC (treated before 2018 for around 1 and half year with ethambutol and azithromycin. Patient developed side effects from rifampicin, hence it was d/c). Patient was followed by Dr. Guillen previously and she came today to establish care.  Patient is using VEST, Aerobica and hypertonic saline for airway clearance. Patient is c/o more cough and expectoration then before. She is also feeling slightly more SOB than usual.  12/15/21: Patient was treated with Levofloxacin for pseudomonas in the past. She feels much better. Sensitivity for MAC is still pending.  2/16/22: Patient remains symptomatic. Denies cough or expectoration. Patient had CT chest in Nov 2021 for persistent cough.  8/17/22: No exacerbation since last visit. Complaint with Aerobika, hypertonic saline and VEST.  3/22/23: Patient stopped Breo after last visit. Used albuterol for 2-3 times since last visit. She was recently diagnosed as COVID and started to have wheezing again.  4/26/23: Patient has restarted Breo. She had episode of chest pain which required hospitalization. Etiology was not clear. Symptoms resolved. Denies change in cough or expectoration.  6/2/23: TTM to discuss the results of CT chest.  10/23/23: Over the summer, had worsening of cough and MORALES when walking up stairs. Resumed treatment 9/8 (RIF/Azithro/ETB). Patient sputum production has improved with no real shortness of breath.    1/18/24 Sputum Cx 11/23 grew P. mirabilis; ID in agreement not to treat. Saw optho told no eye issues. Doing well from pulm perspective. No sob or weight loss. Adherent all therapy. [ESS] : 2

## 2024-01-19 NOTE — CONSULT LETTER
[Dear  ___] : Dear  [unfilled], [Courtesy Letter:] : I had the pleasure of seeing your patient, [unfilled], in my office today. [Please see my note below.] : Please see my note below. [Consult Closing:] : Thank you very much for allowing me to participate in the care of this patient.  If you have any questions, please do not hesitate to contact me. [Sincerely,] : Sincerely, [FreeTextEntry3] : Yael Allen MD  [employer] at [facility] Pulmonary, Critical Care, and Sleep Medicine

## 2024-01-19 NOTE — ASSESSMENT
[FreeTextEntry1] : Reviewed: Radiology: - CT Chest (8/23): waxing/waning appearance of infectious airway disease, with improvement in patchy areas of groundglass opacity and consolidation in RLL and CESAR and worsening of groundglass opacities in RUL and tree in bud opacities in LLL - CT chest (5/23): Overall increase in small and large airway disease worsening with foci of parenchymal opacities in RLL - CT chest (11/21):Mild interval worsening of patchy airspace and branching "tree in bud" opacities, most prominent in the right upper and right middle lobes, likely infectious or inflammatory in etiology. Persistent RML and Lingula bronchiectasis (no change) - CT chest (8/21): Bilateral RML, lingula bronchiectasis (stable). New focus of small airway infection in the peripheral upper lobe with tree in bud nodules with slightly more focal/dominant central nodule measuring 7 mm. - CT chest (2019): RML and lingula bronchiectasis  Lung function tests: - PFT (8 /22): FEV1 66, FEV1/FVC 71, TLC 78, DLCO 67 - Six minute walk test (8/22): 94% on RA, HR increased from 68 to 104/minute with exertion. - PFT (8 /21 ): FEV1 71, FEV1/FVC 73, TLC 76, DLCO 69, Rev 2% - 6 minute walk test (8/21): 96% on RA with ambulation  Microbiology: - Sputum Culture (6/23): AFB: MAC (Sensitive to amikacin, clarithromycin) - Sputum culture results (7/21): AFB : MAC (sensitivity: Sensitive to rifampin and ethambutol, synergistic (ethambutol and rifampin). Clarithromycin (16, intermediate sensitivity) Bacterial: normal estella Fungus: Rare aspergillus niger - Sputum culture results (2018): Sputum AFB 1/30/18: no growth at 6 weeks Sputum C&S 1/30/18: numerous mucin producing Pseudomonas aeruginosa Sputum C&S 2/22/18: numerous mucin producing Pseudomonas aeruginosa Sputum C&2 11/23: + proteius mirabilis  AFB 11/23: negative Fungal Culture 11/23: rare yeast   Labs for bronchiectasis: All labs for bronchiectasis were non contributory  63 year old female with h/o MAC (s/p treatment with ethambutol and azithromycin for 1 year in 2011), presenting with persistent sputum positive for MAC with h/o pseudomonas in sputum, resumed RIF/Azith/ETB (9/8/23-).  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 Clarithromycin VENU 16 (intermediate sen). Sensitive to rifampin and ethambutol, synergistic (ethambutol and rifampin). - Repeat CT chest in May 2023 showed worsening bronchiectasis and tree in bud opacities - Persistent cough and expectoration. - No significant change in PFT. Plan: - Started on Rif/Azithro/Ethambutol 9/2023; managed by Dr. Ibrahim - No current tissues with therapy  - Following with ENT for hearing and opthamology for vision - Sputum cultures negative; new set ordered today  (2) Allergic Rhinitis: - Continue Flonase nasal spray  Follow up in 3 months, will need PFTs in 2024..

## 2024-01-23 ENCOUNTER — APPOINTMENT (OUTPATIENT)
Dept: INFECTIOUS DISEASE | Facility: CLINIC | Age: 64
End: 2024-01-23
Payer: COMMERCIAL

## 2024-01-23 VITALS
HEART RATE: 63 BPM | TEMPERATURE: 96.5 F | HEIGHT: 65 IN | SYSTOLIC BLOOD PRESSURE: 120 MMHG | WEIGHT: 117 LBS | OXYGEN SATURATION: 98 % | BODY MASS INDEX: 19.49 KG/M2 | DIASTOLIC BLOOD PRESSURE: 74 MMHG

## 2024-01-23 PROCEDURE — 99214 OFFICE O/P EST MOD 30 MIN: CPT

## 2024-01-23 NOTE — ASSESSMENT
[FreeTextEntry1] : 61F h/o bronchiectasis, pulmonary MAC infection s/p ETB/Azithro ~ 1 yr around 2011 now on RIF/Azithro/ETB (9/8/23-) p/w management of pulmonary MAC infection.  Patient is tolerating all meds and doing well clinically.  Last sputum AFB culture 11/2023 remains negative.  If repeat sputum this month is negative, then I think we can start the treatment clock on 11/2023.    - cont Azithromycin 500mg PO daily - cont Rifampin 600mg PO daily - cont Ethambutol 800mg PO daily (Wt 116lb = 52.6kg) - labs today - f/u Dr. Allen (pul) - Repeat AFB sputum culture to be sent today (patient brought specimen) - RTC in 2 months

## 2024-01-23 NOTE — PHYSICAL EXAM
[General Appearance - Alert] : alert [General Appearance - In No Acute Distress] : in no acute distress [Sclera] : the sclera and conjunctiva were normal [Neck Appearance] : the appearance of the neck was normal [Outer Ear] : the ears and nose were normal in appearance [] : no respiratory distress [Auscultation Breath Sounds / Voice Sounds] : lungs were clear to auscultation bilaterally [Heart Sounds] : normal S1 and S2 [Heart Rate And Rhythm] : heart rate was normal and rhythm regular [Bowel Sounds] : normal bowel sounds [Abdomen Soft] : soft [Abdomen Tenderness] : non-tender

## 2024-01-23 NOTE — HISTORY OF PRESENT ILLNESS
[FreeTextEntry1] : 61F h/o bronchiectasis, pulmonary MAC infection s/p ETB/Azithro ~ 1 yr around 2011 now on RIF/Azithro/ETB (9/8/23-) p/w management of pulmonary MAC infection.   She started to get repeated PNA about 20 yrs ago.  She was treated repeatedly without improvement.  She got multiple courses of abx and got admitted multiple times.  She was first diagnosed with MAC pulmonary infection 12 years ago for the first time while admitted at [facility] by ID physician.  She was treated with ETB/Azithro for 9 months, then RIF was added.  She had very foggy brain on RIF, was on three drug combo for 3-4 months, then treatment was discontinued.  She was fine for a while, then developed chronic cough, SOB, so she was referred to Dr. Guillen in 2018.  She was started on different inhaler regimen which significantly improved her symptoms.  She is on Breos, inh saline.  She used to use VEST but she was diagnosed with osteoporosis and got 2 rib fracture so she stopped using.  She was referred to Dr. De La O in 2021 Bronchiectasis center.  Her symptoms have been intermittently waxing/waning.  Last hospital admission was around 2018 when she had bacterial pneumonia.  Last abx treatment for PNA was around 2018 with Dr. Rowley as outpatient.    When her symptom is well controlled, her baseline symptom is intermittent cough, and productive cough overnight but no SOB.  Recently she noticed more productive cough than before but she thinks she can clear it and can sleep well after.  Denied SOB.  She can walk half a mile without getting SOB and doesn't have any issue carrying daily activities.  Denied fever/chills, hemoptysis, night sweats, weight loss.  CT chest 5/12 showed overall increased small and large airway disease with foci of parenchymal opacities in right lower lobe, probably increased CHALINO.  Repeat sputum culture sent on 6/6.  She was referred to ID to see if MAC treatment should be initiated or not.  She saw me for the first time on 6/9.  At that time her symptom was at baseline and she was doing well.  6/6 culture was still ngtd.  No treatment was indicated at that time.  Since then patient developed worsening cough.  6/6 AFB sputum culture grew MAC as well.   MAC susceptibility back - S to clarithromycin.   She saw me on 9/7/23 and RIF/Azithro/ETB treatment was initiated on 9/8.  During the follow up on 11/16, she reported improving cough so amikacin was not added.  Last visit was 12/21 and at that time she had URI and had worsening cough.  She saw Dr. Allen on 1/18 and new sputum ordered.  Today she feels well, recovered from URI and is breathing well.  She brought sputum specimen today.  She gets intermittent dry cough in the evening but no SOB.  No fever/chills.  Vision stable.  Tolerating abx. 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

## 2024-01-24 ENCOUNTER — NON-APPOINTMENT (OUTPATIENT)
Age: 64
End: 2024-01-24

## 2024-01-24 LAB
ALBUMIN SERPL ELPH-MCNC: 4.4 G/DL
ALP BLD-CCNC: 54 U/L
ALT SERPL-CCNC: 19 U/L
ANION GAP SERPL CALC-SCNC: 10 MMOL/L
AST SERPL-CCNC: 26 U/L
BASOPHILS # BLD AUTO: 0.09 K/UL
BASOPHILS NFR BLD AUTO: 1.9 %
BILIRUB SERPL-MCNC: 0.4 MG/DL
BUN SERPL-MCNC: 11 MG/DL
CALCIUM SERPL-MCNC: 9.5 MG/DL
CHLORIDE SERPL-SCNC: 101 MMOL/L
CO2 SERPL-SCNC: 25 MMOL/L
CREAT SERPL-MCNC: 0.7 MG/DL
EGFR: 98 ML/MIN/1.73M2
EOSINOPHIL # BLD AUTO: 0.17 K/UL
EOSINOPHIL NFR BLD AUTO: 3.6 %
GLUCOSE SERPL-MCNC: 82 MG/DL
HCT VFR BLD CALC: 41.8 %
HGB BLD-MCNC: 14.2 G/DL
IMM GRANULOCYTES NFR BLD AUTO: 0.2 %
LYMPHOCYTES # BLD AUTO: 1.31 K/UL
LYMPHOCYTES NFR BLD AUTO: 27.8 %
MAN DIFF?: NORMAL
MCHC RBC-ENTMCNC: 31.7 PG
MCHC RBC-ENTMCNC: 34 GM/DL
MCV RBC AUTO: 93.3 FL
MONOCYTES # BLD AUTO: 0.36 K/UL
MONOCYTES NFR BLD AUTO: 7.6 %
NEUTROPHILS # BLD AUTO: 2.77 K/UL
NEUTROPHILS NFR BLD AUTO: 58.9 %
PLATELET # BLD AUTO: 233 K/UL
POTASSIUM SERPL-SCNC: 4.2 MMOL/L
PROT SERPL-MCNC: 6.7 G/DL
RBC # BLD: 4.48 M/UL
RBC # FLD: 12.3 %
SODIUM SERPL-SCNC: 136 MMOL/L
WBC # FLD AUTO: 4.71 K/UL

## 2024-01-25 ENCOUNTER — NON-APPOINTMENT (OUTPATIENT)
Age: 64
End: 2024-01-25

## 2024-01-26 LAB — BACTERIA SPT CULT: ABNORMAL

## 2024-03-10 ENCOUNTER — NON-APPOINTMENT (OUTPATIENT)
Age: 64
End: 2024-03-10

## 2024-03-10 LAB — ACID FAST STN SPT: NORMAL

## 2024-03-19 ENCOUNTER — APPOINTMENT (OUTPATIENT)
Dept: INFECTIOUS DISEASE | Facility: CLINIC | Age: 64
End: 2024-03-19
Payer: COMMERCIAL

## 2024-03-19 VITALS
WEIGHT: 116 LBS | OXYGEN SATURATION: 96 % | DIASTOLIC BLOOD PRESSURE: 65 MMHG | HEART RATE: 71 BPM | BODY MASS INDEX: 19.33 KG/M2 | SYSTOLIC BLOOD PRESSURE: 128 MMHG | HEIGHT: 65 IN | TEMPERATURE: 96.5 F

## 2024-03-19 DIAGNOSIS — R91.1 SOLITARY PULMONARY NODULE: ICD-10-CM

## 2024-03-19 PROCEDURE — G2211 COMPLEX E/M VISIT ADD ON: CPT

## 2024-03-19 PROCEDURE — 99214 OFFICE O/P EST MOD 30 MIN: CPT

## 2024-03-19 RX ORDER — ETHAMBUTOL HYDROCHLORIDE 400 MG/1
400 TABLET ORAL DAILY
Qty: 60 | Refills: 5 | Status: ACTIVE | COMMUNITY
Start: 2023-09-08 | End: 1900-01-01

## 2024-03-19 RX ORDER — AZITHROMYCIN 500 MG/1
500 TABLET, FILM COATED ORAL DAILY
Qty: 30 | Refills: 5 | Status: ACTIVE | COMMUNITY
Start: 2023-09-08 | End: 1900-01-01

## 2024-03-19 RX ORDER — RIFAMPIN 300 MG/1
300 CAPSULE ORAL DAILY
Qty: 60 | Refills: 5 | Status: ACTIVE | COMMUNITY
Start: 2023-09-08 | End: 1900-01-01

## 2024-03-19 NOTE — DATA REVIEWED
[FreeTextEntry1] : ## MICRO ## 1/18/24 Sputum AFB: ngtd 1/18/24 Sputum: P. mirabilis  11/22/23 Sputum fungal: candida 11/22/23 Sputum AFB: ngtd 11/22/23 Sputum: P. mirabilis, NRF  6/6/23 Sputum culture: NRF 6/6/23 Sputum fungal culture: p 6/6/23 Sputum AFB culture: MAC (S to amik, clarithro)  11/29/21 Sputum culture: few Aspergillus fumigatus 11/29/21 Sputum Fungal culture: few Aspergillus fumigatus 11/29/21 Sputum AFB culture: MAC  11/17/21 Sputum culture: NRF 11/17/21 Sputum Fungal culture: few Aspergillus fumigatus 11/17/21 Sputum AFB culture: MAC  7/27/21 Sputum culture: NRF 7/27/21 Sputum Fungal culture: few Aspergillus fumigatus 7/27/21 Sputum AFB culture: MAC  6/26/21 Sputum AFB culture: MAC (RIF <0.12 S (combo assay), ETB <1.25 S (combo assay), EMB/RIF combo SYN, Amik S, Cipro R, Clarithro I, Clofazemine 0.12, doxy >8, linezolid R, isidoro >8, Moxi R, Bact 2/38  ## IMAGING ## 8/18/23 CT chest Impression: Since 5/12/2023, there is a waxing and waning appearance of infectious airways disease, with improvement in the patchy areas of groundglass opacity and consolidation that were previously present in the right lower lobe and left upper lobe and worsening of groundglass opacities in the right upper lobe and tree-in-bud opacities in the left lower lobe.  5/25/23 CT chest FINDINGS:  LUNGS AND AIRWAYS: Interval changes as follows: *  Right upper lobe decreased large and small airway disease with decreased nodular and tree-in-bud opacities *  Right middle lobe again cystic bronchiectasis with atelectasis and slightly increased bronchiectasis opacification *  Right lower lobe new and increased nodules and opacities (6, 249) and increased opacification of medial segment bronchiectasis *  Left upper lobe increased large and small airway disease (6, 130-135) and increased opacification of left lingual cystic bronchiectasis *  Left lower lobe unchanged foci of small and large airway disease PLEURA: No pleural effusion. MEDIASTINUM AND TINA: No lymphadenopathy. VESSELS: Within normal limits. HEART: Heart size is normal. No pericardial effusion. CHEST WALL AND LOWER NECK: Within normal limits. VISUALIZED UPPER ABDOMEN: Within normal limits. BONES: No suspicious lesion. Since November 1, 2021, new chronic compression fractures T8 and L1, no osseous retropulsion.  IMPRESSION: 1. Since November 1, 2021, overall increased small and large airway disease with foci of parenchymal opacities in right lower lobe, probably increased CHALINO.

## 2024-03-19 NOTE — ASSESSMENT
[FreeTextEntry1] : 61F h/o bronchiectasis, pulmonary MAC infection s/p ETB/Azithro ~ 1 yr around 2011 now on RIF/Azithro/ETB (9/8/23-) p/w management of pulmonary MAC infection.  Patient is tolerating all meds and doing well clinically. AFB sputum 11/16/23 and 1/18/24 are all neg.  Bryon do >12 months of therapy since 11/2023.    - cont Azithromycin 500mg PO daily - cont Rifampin 600mg PO daily - cont Ethambutol 800mg PO daily (Wt 116lb = 52.6kg) - labs today - f/u Dr. Allen (pul) - RTC in 3 months

## 2024-03-19 NOTE — HISTORY OF PRESENT ILLNESS
[FreeTextEntry1] : 61F h/o bronchiectasis, pulmonary MAC infection s/p ETB/Azithro ~ 1 yr around 2011 now on RIF/Azithro/ETB (9/8/23-) p/w management of pulmonary MAC infection.   She started to get repeated PNA about 20 yrs ago.  She was treated repeatedly without improvement.  She got multiple courses of abx and got admitted multiple times.  She was first diagnosed with MAC pulmonary infection 12 years ago for the first time while admitted at [facility] by ID physician.  She was treated with ETB/Azithro for 9 months, then RIF was added.  She had very foggy brain on RIF, was on three drug combo for 3-4 months, then treatment was discontinued.  She was fine for a while, then developed chronic cough, SOB, so she was referred to Dr. Guillen in 2018.  She was started on different inhaler regimen which significantly improved her symptoms.  She is on Breos, inh saline.  She used to use VEST but she was diagnosed with osteoporosis and got 2 rib fracture so she stopped using.  She was referred to Dr. De La O in 2021 Bronchiectasis center.  Her symptoms have been intermittently waxing/waning.  Last hospital admission was around 2018 when she had bacterial pneumonia.  Last abx treatment for PNA was around 2018 with Dr. Rowley as outpatient.    When her symptom is well controlled, her baseline symptom is intermittent cough, and productive cough overnight but no SOB.  Recently she noticed more productive cough than before but she thinks she can clear it and can sleep well after.  Denied SOB.  She can walk half a mile without getting SOB and doesn't have any issue carrying daily activities.  Denied fever/chills, hemoptysis, night sweats, weight loss.  CT chest 5/12 showed overall increased small and large airway disease with foci of parenchymal opacities in right lower lobe, probably increased CHALINO.  Repeat sputum culture sent on 6/6.  She was referred to ID to see if MAC treatment should be initiated or not.  She saw me for the first time on 6/9.  At that time her symptom was at baseline and she was doing well.  6/6 culture was still ngtd.  No treatment was indicated at that time.  Since then patient developed worsening cough.  6/6 AFB sputum culture grew MAC as well.   MAC susceptibility back - S to clarithromycin.   She saw me on 9/7/23 and RIF/Azithro/ETB treatment was initiated on 9/8.  During the follow up on 11/16, she reported improving cough so amikacin was not added. She saw Dr. Allen on 1/18 and sputum obtained, AFB sputum ngtd so far.  Sputum culture grew P. mirabilis.  Last visit with me was 1/23, she was doing well at that time.   Today patient feels well, intermittent cough in the evening with mucus.  Denied SOB.  No fever/chills, vision change, n/v/d, abdominal pain.   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

## 2024-03-19 NOTE — PHYSICAL EXAM
[General Appearance - Alert] : alert [Sclera] : the sclera and conjunctiva were normal [General Appearance - In No Acute Distress] : in no acute distress [Neck Appearance] : the appearance of the neck was normal [Outer Ear] : the ears and nose were normal in appearance [Auscultation Breath Sounds / Voice Sounds] : lungs were clear to auscultation bilaterally [] : no respiratory distress [Heart Sounds] : normal S1 and S2 [Heart Rate And Rhythm] : heart rate was normal and rhythm regular [Bowel Sounds] : normal bowel sounds [Abdomen Tenderness] : non-tender [Abdomen Soft] : soft

## 2024-03-20 LAB
ALBUMIN SERPL ELPH-MCNC: 4.2 G/DL
ALP BLD-CCNC: 56 U/L
ALT SERPL-CCNC: 11 U/L
ANION GAP SERPL CALC-SCNC: 12 MMOL/L
AST SERPL-CCNC: 19 U/L
BASOPHILS # BLD AUTO: 0.08 K/UL
BASOPHILS NFR BLD AUTO: 1.5 %
BILIRUB SERPL-MCNC: 0.3 MG/DL
BUN SERPL-MCNC: 11 MG/DL
CALCIUM SERPL-MCNC: 9.4 MG/DL
CHLORIDE SERPL-SCNC: 105 MMOL/L
CO2 SERPL-SCNC: 22 MMOL/L
CREAT SERPL-MCNC: 0.74 MG/DL
EGFR: 92 ML/MIN/1.73M2
EOSINOPHIL # BLD AUTO: 0.14 K/UL
EOSINOPHIL NFR BLD AUTO: 2.6 %
GLUCOSE SERPL-MCNC: 83 MG/DL
HCT VFR BLD CALC: 41.4 %
HGB BLD-MCNC: 14.1 G/DL
IMM GRANULOCYTES NFR BLD AUTO: 0.4 %
LYMPHOCYTES # BLD AUTO: 1.31 K/UL
LYMPHOCYTES NFR BLD AUTO: 24.6 %
MAN DIFF?: NORMAL
MCHC RBC-ENTMCNC: 31.5 PG
MCHC RBC-ENTMCNC: 34.1 GM/DL
MCV RBC AUTO: 92.4 FL
MONOCYTES # BLD AUTO: 0.51 K/UL
MONOCYTES NFR BLD AUTO: 9.6 %
NEUTROPHILS # BLD AUTO: 3.27 K/UL
NEUTROPHILS NFR BLD AUTO: 61.3 %
PLATELET # BLD AUTO: 283 K/UL
POTASSIUM SERPL-SCNC: 4.5 MMOL/L
PROT SERPL-MCNC: 6.9 G/DL
RBC # BLD: 4.48 M/UL
RBC # FLD: 12.2 %
SODIUM SERPL-SCNC: 139 MMOL/L
WBC # FLD AUTO: 5.33 K/UL

## 2024-04-13 NOTE — PHYSICAL EXAM
[No Acute Distress] : no acute distress [Well Nourished] : well nourished [Normal Oropharynx] : normal oropharynx [II] : Mallampati Class: II [Normal Appearance] : normal appearance [No JVD] : no jvd [Normal Rate/Rhythm] : normal rate/rhythm [Normal S1, S2] : normal s1, s2 [No Resp Distress] : no resp distress [No Acc Muscle Use] : no acc muscle use [No Abnormalities] : no abnormalities [Benign] : benign [Not Tender] : not tender [Normal Gait] : normal gait [No Clubbing] : no clubbing [No Cyanosis] : no cyanosis [No Edema] : no edema [Normal Color/ Pigmentation] : normal color/ pigmentation [No Rash] : no rash [No Focal Deficits] : no focal deficits [No Sensory Deficits] : no sensory deficits [Oriented x3] : oriented x3 [Normal Affect] : normal affect [TextBox_68] : crepitations Gen: Patient is unresponsive to sternal rub  HEENT: NCAT, pupils are equal and reactive to light bilaterally, normal conjunctiva, tongue midline, oral mucosa moist  Lung: CTAB, no respiratory distress, no wheezes/rhonchi/rales B/L  CV: tachycardic, no murmurs, rubs or gallops, distal pulses 2+ b/l  Abd: soft, NT, ND, no guarding, no rigidity, no rebound tenderness  MSK: no visible deformities  Neuro: unresponsive, not moving extremities, CN exam (limited) normal    Skin: Warm, well perfused, no leg swelling

## 2024-05-05 ENCOUNTER — NON-APPOINTMENT (OUTPATIENT)
Age: 64
End: 2024-05-05

## 2024-05-09 ENCOUNTER — APPOINTMENT (OUTPATIENT)
Dept: PULMONOLOGY | Facility: CLINIC | Age: 64
End: 2024-05-09
Payer: COMMERCIAL

## 2024-05-09 VITALS
SYSTOLIC BLOOD PRESSURE: 123 MMHG | HEART RATE: 84 BPM | TEMPERATURE: 98.1 F | DIASTOLIC BLOOD PRESSURE: 77 MMHG | OXYGEN SATURATION: 97 % | WEIGHT: 118 LBS | HEIGHT: 65 IN | BODY MASS INDEX: 19.66 KG/M2 | RESPIRATION RATE: 12 BRPM

## 2024-05-09 DIAGNOSIS — A31.0 PULMONARY MYCOBACTERIAL INFECTION: ICD-10-CM

## 2024-05-09 DIAGNOSIS — J47.9 BRONCHIECTASIS, UNCOMPLICATED: ICD-10-CM

## 2024-05-09 DIAGNOSIS — R05.9 COUGH, UNSPECIFIED: ICD-10-CM

## 2024-05-09 PROCEDURE — ZZZZZ: CPT

## 2024-05-09 PROCEDURE — 94618 PULMONARY STRESS TESTING: CPT

## 2024-05-09 PROCEDURE — 99214 OFFICE O/P EST MOD 30 MIN: CPT | Mod: 25

## 2024-05-09 PROCEDURE — 94729 DIFFUSING CAPACITY: CPT

## 2024-05-09 PROCEDURE — 94010 BREATHING CAPACITY TEST: CPT

## 2024-05-09 PROCEDURE — 94727 GAS DIL/WSHOT DETER LNG VOL: CPT

## 2024-05-09 RX ORDER — PANTOPRAZOLE 40 MG/1
40 TABLET, DELAYED RELEASE ORAL
Qty: 30 | Refills: 3 | Status: ACTIVE | COMMUNITY
Start: 2024-05-09 | End: 1900-01-01

## 2024-05-10 PROBLEM — A31.0 MAI (MYCOBACTERIUM AVIUM-INTRACELLULARE): Status: ACTIVE | Noted: 2018-01-30

## 2024-05-10 PROBLEM — R05.9 COUGH: Status: ACTIVE | Noted: 2018-05-21

## 2024-05-10 PROBLEM — J47.9 BRONCHIECTASIS: Status: ACTIVE | Noted: 2018-01-30

## 2024-05-10 NOTE — ASSESSMENT
[FreeTextEntry1] : Reviewed: Radiology: - CT Chest (8/23): waxing/waning appearance of infectious airway disease, with improvement in patchy areas of groundglass opacity and consolidation in RLL and CESAR and worsening of groundglass opacities in RUL and tree in bud opacities in LLL - CT chest (5/23): Overall increase in small and large airway disease worsening with foci of parenchymal opacities in RLL - CT chest (11/21):Mild interval worsening of patchy airspace and branching "tree in bud" opacities, most prominent in the right upper and right middle lobes, likely infectious or inflammatory in etiology. Persistent RML and Lingula bronchiectasis (no change) - CT chest (8/21): Bilateral RML, lingula bronchiectasis (stable). New focus of small airway infection in the peripheral upper lobe with tree in bud nodules with slightly more focal/dominant central nodule measuring 7 mm. - CT chest (2019): RML and lingula bronchiectasis  Lung function tests: - PFT (5/9/2023): FEV1 69, FEV1/FVC 95, TLC 79, DLCO 72 - 6MWT (5/9/2023): 97% on RA; 95% after walking 536 meters - PFT (8 /22): FEV1 66, FEV1/FVC 71, TLC 78, DLCO 67 - Six minute walk test (8/22): 94% on RA, HR increased from 68 to 104/minute with exertion. - PFT (8 /21 ): FEV1 71, FEV1/FVC 73, TLC 76, DLCO 69, Rev 2% - 6 minute walk test (8/21): 96% on RA with ambulation  Microbiology: - Sputum Culture (6/23): AFB: MAC (Sensitive to amikacin, clarithromycin) - Sputum culture results (7/21): AFB : MAC (sensitivity: Sensitive to rifampin and ethambutol, synergistic (ethambutol and rifampin). Clarithromycin (16, intermediate sensitivity) Bacterial: normal estella Fungus: Rare aspergillus niger - Sputum culture results (2018): Sputum AFB 1/30/18: no growth at 6 weeks Sputum C&S 1/30/18: numerous mucin producing Pseudomonas aeruginosa Sputum C&S 2/22/18: numerous mucin producing Pseudomonas aeruginosa Sputum C&2 11/23: + Proteaus mirabilis AFB 11/23: negative Fungal Culture 11/23: rare yeast Sputum Culture 1/24: P mirabilis  AFB 1/24: no growth at 6 weeks  Labs for bronchiectasis: All labs for bronchiectasis were noncontributory  63-year-old female with h/o MAC (s/p treatment with ethambutol and azithromycin for 1 year in 2011), presenting with persistent sputum positive for MAC with h/o pseudomonas in sputum, resumed RIF/Azith/ETB (9/8/23-). AFB negative since 11/2023.   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 Clarithromycin VENU 16 (intermediate sen). Sensitive to rifampin and ethambutol, synergistic (ethambutol and rifampin). - Repeat CT chest in May 2023 showed worsening bronchiectasis and tree in bud opacities - Persistent cough and expectoration, particularly in the evenings.  - No significant change in PFT done today Plan: - Started on Rif/Azithro/Ethambutol 9/2023; managed by Dr. Ibrahim; plan for >12 months therapy since 11/2023 - No current tissues with therapy - Following with ophthalmology for vision; advised to establish care with ENT for hearing eval  - New set of sputum cultures ordered today - Start PPI. Advised to do airway clearance in the evening to improve PM cough.   (2) Allergic Rhinitis: - Resolved. No longer taking Flonase nasal spray.   Follow up in 6 months.

## 2024-05-10 NOTE — HISTORY OF PRESENT ILLNESS
[Never] : never [TextBox_4] : 61-year-old female with h/o Pulmonary MAC (treated before 2018 for around 1 and half year with ethambutol and azithromycin. Patient developed side effects from rifampicin, hence it was d/c). Patient was followed by Dr. Guillen previously and she came today to establish care.  Patient is using VEST, Aerobica and hypertonic saline for airway clearance. Patient is c/o more cough and expectoration then before. She is also feeling slightly more SOB than usual.  12/15/21: Patient was treated with Levofloxacin for pseudomonas in the past. She feels much better. Sensitivity for MAC is still pending.  2/16/22: Patient remains symptomatic. Denies cough or expectoration. Patient had CT chest in Nov 2021 for persistent cough.  8/17/22: No exacerbation since last visit. Complaint with Aerobika, hypertonic saline and VEST.  3/22/23: Patient stopped Breo after last visit. Used albuterol for 2-3 times since last visit. She was recently diagnosed as COVID and started to have wheezing again.  4/26/23: Patient has restarted Breo. She had episode of chest pain which required hospitalization. Etiology was not clear. Symptoms resolved. Denies change in cough or expectoration.  6/2/23: TTM to discuss the results of CT chest.  10/23/23: Over the summer, had worsening of cough and MORALES when walking up stairs. Resumed treatment 9/8 (RIF/Azithro/ETB). Patient sputum production has improved with no real shortness of breath.  1/18/24 Sputum Cx 11/23 grew P. mirabilis; ID in agreement not to treat. Saw optho told no eye issues. Doing well from pulm perspective. No sob or weight loss. Adherent all therapy.    5/6/2024:  AFB sputum negative since 11/2023. Doing well, no weight loss, dyspnea. Productive cough still present and worse in the evening before bed. Uses hypertonic saline (1-2 times per day), aerobika (few times daily). Walks about 1 mile daily. No s/e from abx. Up-to-date with optho.  [ESS] : 3

## 2024-05-10 NOTE — CONSULT LETTER
[Dear  ___] : Dear  [unfilled], [Courtesy Letter:] : I had the pleasure of seeing your patient, [unfilled], in my office today. [Please see my note below.] : Please see my note below. [Consult Closing:] : Thank you very much for allowing me to participate in the care of this patient.  If you have any questions, please do not hesitate to contact me. [Sincerely,] : Sincerely, [FreeTextEntry3] : Yael Allen MD  [employer] [facility]

## 2024-05-10 NOTE — END OF VISIT
[FreeTextEntry3] :   I, Yael Allen MD, personally performed the evaluation and management (E/M) services for this patient. That E/M includes conducting the clinically appropriate initial history &/or exam, assessing all conditions, and establishing the plan of care. I have also independently reviewed the medical records and imaging at the time of this office visit, and discussed the above mentioned images with interpretations with the patient. Today, CJ Horvath was here to participate in the visit & follow plan of care established by me.

## 2024-05-10 NOTE — PHYSICAL EXAM
[No Acute Distress] : no acute distress [Normal Appearance] : normal appearance [No Resp Distress] : no resp distress [Clear to Auscultation Bilaterally] : clear to auscultation bilaterally [Oriented x3] : oriented x3 [Normal Affect] : normal affect [Rhonchi] : rhonchi [Normal Gait] : normal gait

## 2024-05-18 ENCOUNTER — NON-APPOINTMENT (OUTPATIENT)
Age: 64
End: 2024-05-18

## 2024-05-20 ENCOUNTER — NON-APPOINTMENT (OUTPATIENT)
Age: 64
End: 2024-05-20

## 2024-05-20 LAB — BACTERIA SPT CULT: ABNORMAL

## 2024-05-26 ENCOUNTER — NON-APPOINTMENT (OUTPATIENT)
Age: 64
End: 2024-05-26

## 2024-06-01 ENCOUNTER — NON-APPOINTMENT (OUTPATIENT)
Age: 64
End: 2024-06-01

## 2024-06-15 LAB — FUNGUS SPT CULT: ABNORMAL

## 2024-06-16 ENCOUNTER — NON-APPOINTMENT (OUTPATIENT)
Age: 64
End: 2024-06-16

## 2024-06-24 LAB — ACID FAST STN SPT: NORMAL

## 2024-07-29 ENCOUNTER — NON-APPOINTMENT (OUTPATIENT)
Age: 64
End: 2024-07-29

## 2024-07-30 ENCOUNTER — APPOINTMENT (OUTPATIENT)
Dept: INFECTIOUS DISEASE | Facility: CLINIC | Age: 64
End: 2024-07-30
Payer: COMMERCIAL

## 2024-07-30 VITALS
HEIGHT: 65 IN | TEMPERATURE: 97.5 F | DIASTOLIC BLOOD PRESSURE: 66 MMHG | OXYGEN SATURATION: 96 % | BODY MASS INDEX: 19.49 KG/M2 | HEART RATE: 71 BPM | WEIGHT: 117 LBS | SYSTOLIC BLOOD PRESSURE: 133 MMHG

## 2024-07-30 DIAGNOSIS — A31.0 PULMONARY MYCOBACTERIAL INFECTION: ICD-10-CM

## 2024-07-30 DIAGNOSIS — J47.9 BRONCHIECTASIS, UNCOMPLICATED: ICD-10-CM

## 2024-07-30 PROCEDURE — 99214 OFFICE O/P EST MOD 30 MIN: CPT

## 2024-07-30 PROCEDURE — G2211 COMPLEX E/M VISIT ADD ON: CPT

## 2024-07-30 NOTE — PHYSICAL EXAM
[General Appearance - In No Acute Distress] : in no acute distress [General Appearance - Alert] : alert [Sclera] : the sclera and conjunctiva were normal [Outer Ear] : the ears and nose were normal in appearance [Neck Appearance] : the appearance of the neck was normal [] : no respiratory distress [Heart Rate And Rhythm] : heart rate was normal and rhythm regular [Heart Sounds] : normal S1 and S2 [Bowel Sounds] : normal bowel sounds [Abdomen Tenderness] : non-tender [Abdomen Soft] : soft [FreeTextEntry1] : rare rhonchi

## 2024-07-30 NOTE — DATA REVIEWED
[FreeTextEntry1] : ## MICRO ## 5/17/24 Sputum AFB: neg 5/17/24 Sputum: P. mirabilis  5/17/24 Sputum fungal: few yeast, rare penicillium spp 1/18/24 Sputum AFB: ngtd 1/18/24 Sputum: P. mirabilis  11/22/23 Sputum fungal: candida 11/22/23 Sputum AFB: ngtd 11/22/23 Sputum: P. mirabilis, NRF  6/6/23 Sputum culture: NRF 6/6/23 Sputum fungal culture: p 6/6/23 Sputum AFB culture: MAC (S to amik, clarithro)  11/29/21 Sputum culture: few Aspergillus fumigatus 11/29/21 Sputum Fungal culture: few Aspergillus fumigatus 11/29/21 Sputum AFB culture: MAC  11/17/21 Sputum culture: NRF 11/17/21 Sputum Fungal culture: few Aspergillus fumigatus 11/17/21 Sputum AFB culture: MAC  7/27/21 Sputum culture: NRF 7/27/21 Sputum Fungal culture: few Aspergillus fumigatus 7/27/21 Sputum AFB culture: MAC  6/26/21 Sputum AFB culture: MAC (RIF <0.12 S (combo assay), ETB <1.25 S (combo assay), EMB/RIF combo SYN, Amik S, Cipro R, Clarithro I, Clofazemine 0.12, doxy >8, linezolid R, isidoro >8, Moxi R, Bact 2/38  ## IMAGING ## 8/18/23 CT chest Impression: Since 5/12/2023, there is a waxing and waning appearance of infectious airways disease, with improvement in the patchy areas of groundglass opacity and consolidation that were previously present in the right lower lobe and left upper lobe and worsening of groundglass opacities in the right upper lobe and tree-in-bud opacities in the left lower lobe.  5/25/23 CT chest FINDINGS:  LUNGS AND AIRWAYS: Interval changes as follows: *  Right upper lobe decreased large and small airway disease with decreased nodular and tree-in-bud opacities *  Right middle lobe again cystic bronchiectasis with atelectasis and slightly increased bronchiectasis opacification *  Right lower lobe new and increased nodules and opacities (6, 249) and increased opacification of medial segment bronchiectasis *  Left upper lobe increased large and small airway disease (6, 130-135) and increased opacification of left lingual cystic bronchiectasis *  Left lower lobe unchanged foci of small and large airway disease PLEURA: No pleural effusion. MEDIASTINUM AND TINA: No lymphadenopathy. VESSELS: Within normal limits. HEART: Heart size is normal. No pericardial effusion. CHEST WALL AND LOWER NECK: Within normal limits. VISUALIZED UPPER ABDOMEN: Within normal limits. BONES: No suspicious lesion. Since November 1, 2021, new chronic compression fractures T8 and L1, no osseous retropulsion.  IMPRESSION: 1. Since November 1, 2021, overall increased small and large airway disease with foci of parenchymal opacities in right lower lobe, probably increased CHALINO.

## 2024-07-30 NOTE — HISTORY OF PRESENT ILLNESS
[FreeTextEntry1] : 64F h/o bronchiectasis, pulmonary MAC infection s/p ETB/Azithro ~ 1 yr around 2011 now on RIF/Azithro/ETB (9/8/23-) p/w management of pulmonary MAC infection.   She started to get repeated PNA about 20 yrs ago.  She was treated repeatedly without improvement.  She got multiple courses of abx and got admitted multiple times.  She was first diagnosed with MAC pulmonary infection 12 years ago for the first time while admitted at [facility] by ID physician.  She was treated with ETB/Azithro for 9 months, then RIF was added.  She had very foggy brain on RIF, was on three drug combo for 3-4 months, then treatment was discontinued.  She was fine for a while, then developed chronic cough, SOB, so she was referred to Dr. Guillen in 2018.  She was started on different inhaler regimen which significantly improved her symptoms.  She is on Breos, inh saline.  She used to use VEST but she was diagnosed with osteoporosis and got 2 rib fracture so she stopped using.  She was referred to Dr. De La O in 2021 Bronchiectasis center.  Her symptoms have been intermittently waxing/waning.  Last hospital admission was around 2018 when she had bacterial pneumonia.  Last abx treatment for PNA was around 2018 with Dr. Rowley as outpatient.    When her symptom is well controlled, her baseline symptom is intermittent cough, and productive cough overnight but no SOB.  Recently she noticed more productive cough than before but she thinks she can clear it and can sleep well after.  Denied SOB.  She can walk half a mile without getting SOB and doesn't have any issue carrying daily activities.  Denied fever/chills, hemoptysis, night sweats, weight loss.  CT chest 5/12 showed overall increased small and large airway disease with foci of parenchymal opacities in right lower lobe, probably increased CHALINO.  Repeat sputum culture sent on 6/6.  She was referred to ID to see if MAC treatment should be initiated or not.  She saw me for the first time on 6/9.  At that time her symptom was at baseline and she was doing well.  6/6 culture was still ngtd.  No treatment was indicated at that time.  Since then patient developed worsening cough.  6/6 AFB sputum culture grew MAC as well.   MAC susceptibility back - S to clarithromycin.   She saw me on 9/7/23 and RIF/Azithro/ETB treatment was initiated on 9/8/23.  During the follow up on 11/16, she reported improving cough so amikacin was not added. She saw Dr. Allen on 1/18/24 and sputum obtained, AFB sputum ngtd so far.  Sputum culture grew P. mirabilis.  During the 3/19/24 visit, patient was doing well with intermittent cough. Saw Dr. Allen on 5/9- sputum culture grew P. mirabilis (no symptoms) and AFB culture neg.   Today patient feels well, her breathing is same as last time, has intermittent cough in the evening.  Cough improved after she was started on PPI by Dr. Allen.  No SOB during ambulation and evening has productive cough, stable.  No n/v/d, abdominal pain.  Tolerating all the meds.   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

## 2024-07-30 NOTE — ASSESSMENT
[FreeTextEntry1] : 64F h/o bronchiectasis, pulmonary MAC infection s/p ETB/Azithro ~ 1 yr around 2011 now on RIF/Azithro/ETB (9/8/23-) p/w management of pulmonary MAC infection.  Patient is tolerating all meds and doing well clinically.  Will continue MAC therapy for at least 12 months from first negative culture (11/2023) and will decide when to stop pending clinical course.  May do ~18 months if patient tolerates since this is the second round of treatment.    - cont Azithromycin 500mg PO daily   - cont Rifampin 600mg PO daily - cont Ethambutol 800mg PO daily (Wt 116lb = 52.6kg) - duration of MAC therapy is >12 months since 11/16/23 (negative AFB sputum) - labs today - f/u Dr. Allen (pulm) - RTC in 3 months

## 2024-07-31 LAB
ALBUMIN SERPL ELPH-MCNC: 4.5 G/DL
ALP BLD-CCNC: 60 U/L
ALT SERPL-CCNC: 10 U/L
ANION GAP SERPL CALC-SCNC: 12 MMOL/L
AST SERPL-CCNC: 22 U/L
BASOPHILS # BLD AUTO: 0.07 K/UL
BASOPHILS NFR BLD AUTO: 1.4 %
BILIRUB SERPL-MCNC: 0.2 MG/DL
BUN SERPL-MCNC: 10 MG/DL
CALCIUM SERPL-MCNC: 9.3 MG/DL
CHLORIDE SERPL-SCNC: 105 MMOL/L
CO2 SERPL-SCNC: 24 MMOL/L
CREAT SERPL-MCNC: 0.75 MG/DL
EGFR: 90 ML/MIN/1.73M2
EOSINOPHIL # BLD AUTO: 0.14 K/UL
EOSINOPHIL NFR BLD AUTO: 2.8 %
GLUCOSE SERPL-MCNC: 61 MG/DL
HCT VFR BLD CALC: 41.2 %
HGB BLD-MCNC: 13.5 G/DL
IMM GRANULOCYTES NFR BLD AUTO: 0 %
LYMPHOCYTES # BLD AUTO: 1.28 K/UL
LYMPHOCYTES NFR BLD AUTO: 26 %
MAN DIFF?: NORMAL
MCHC RBC-ENTMCNC: 31.1 PG
MCHC RBC-ENTMCNC: 32.8 GM/DL
MCV RBC AUTO: 94.9 FL
MONOCYTES # BLD AUTO: 0.53 K/UL
MONOCYTES NFR BLD AUTO: 10.8 %
NEUTROPHILS # BLD AUTO: 2.91 K/UL
NEUTROPHILS NFR BLD AUTO: 59 %
PLATELET # BLD AUTO: 257 K/UL
POTASSIUM SERPL-SCNC: 4.3 MMOL/L
PROT SERPL-MCNC: 6.7 G/DL
RBC # BLD: 4.34 M/UL
RBC # FLD: 12.1 %
SODIUM SERPL-SCNC: 142 MMOL/L
WBC # FLD AUTO: 4.93 K/UL

## 2024-08-05 ENCOUNTER — TRANSCRIPTION ENCOUNTER (OUTPATIENT)
Age: 64
End: 2024-08-05

## 2024-08-06 ENCOUNTER — TRANSCRIPTION ENCOUNTER (OUTPATIENT)
Age: 64
End: 2024-08-06

## 2024-09-12 ENCOUNTER — APPOINTMENT (OUTPATIENT)
Dept: PULMONOLOGY | Facility: CLINIC | Age: 64
End: 2024-09-12
Payer: COMMERCIAL

## 2024-09-12 VITALS
SYSTOLIC BLOOD PRESSURE: 137 MMHG | HEIGHT: 65 IN | RESPIRATION RATE: 12 BRPM | OXYGEN SATURATION: 96 % | HEART RATE: 70 BPM | DIASTOLIC BLOOD PRESSURE: 71 MMHG | TEMPERATURE: 97.7 F | WEIGHT: 117 LBS | BODY MASS INDEX: 19.49 KG/M2

## 2024-09-12 DIAGNOSIS — J47.9 BRONCHIECTASIS, UNCOMPLICATED: ICD-10-CM

## 2024-09-12 DIAGNOSIS — R05.9 COUGH, UNSPECIFIED: ICD-10-CM

## 2024-09-12 DIAGNOSIS — A31.0 PULMONARY MYCOBACTERIAL INFECTION: ICD-10-CM

## 2024-09-12 PROCEDURE — 99214 OFFICE O/P EST MOD 30 MIN: CPT

## 2024-09-12 PROCEDURE — G2211 COMPLEX E/M VISIT ADD ON: CPT

## 2024-09-12 NOTE — HISTORY OF PRESENT ILLNESS
[Never] : never [TextBox_4] : 61-year-old female with h/o Pulmonary MAC (treated before 2018 for around 1 and half year with ethambutol and azithromycin. Patient developed side effects from rifampicin, hence it was d/c). Patient was followed by Dr. Guillen previously and she came today to establish care.  Patient is using VEST, Aerobica and hypertonic saline for airway clearance. Patient is c/o more cough and expectoration then before. She is also feeling slightly more SOB than usual.  12/15/21: Patient was treated with Levofloxacin for pseudomonas in the past. She feels much better. Sensitivity for MAC is still pending.  2/16/22: Patient remains symptomatic. Denies cough or expectoration. Patient had CT chest in Nov 2021 for persistent cough.  8/17/22: No exacerbation since last visit. Complaint with Aerobika, hypertonic saline and VEST.  3/22/23: Patient stopped Breo after last visit. Used albuterol for 2-3 times since last visit. She was recently diagnosed as COVID and started to have wheezing again.  4/26/23: Patient has restarted Breo. She had episode of chest pain which required hospitalization. Etiology was not clear. Symptoms resolved. Denies change in cough or expectoration.  6/2/23: TTM to discuss the results of CT chest.  10/23/23: Over the summer, had worsening of cough and MORALES when walking up stairs. Resumed treatment 9/8 (RIF/Azithro/ETB). Patient sputum production has improved with no real shortness of breath.  1/18/24 Sputum Cx 11/23 grew P. mirabilis; ID in agreement not to treat. Saw optho told no eye issues. Doing well from pulm perspective. No sob or weight loss. Adherent all therapy.  5/6/2024: AFB sputum negative since 11/2023. Doing well, no weight loss, dyspnea. Productive cough still present and worse in the evening before bed. Uses hypertonic saline (1-2 times per day), aerobika (few times daily). Walks about 1 mile daily. No s/e from abx. Up-to-date with optho.    9/12/2024  AFB sputum negative since 11/2023. Continues to have productive cough in the evening before bed. Using hypertonic saline twice daily, in AM and after dinner. Has coughing fit nightly before bed, able to cough up thick mucous. No improvement in cough with PPI. No improvement w/ Andres. Spent summer in [place].  [ESS] : 3

## 2024-09-12 NOTE — PHYSICAL EXAM
[No Acute Distress] : no acute distress [No Resp Distress] : no resp distress [Normal Appearance] : normal appearance [Normal Gait] : normal gait [Oriented x3] : oriented x3 [Normal Affect] : normal affect [TextBox_68] : b/l squfreedomks

## 2024-09-12 NOTE — CONSULT LETTER
[Dear  ___] : Dear  [unfilled], [Courtesy Letter:] : I had the pleasure of seeing your patient, [unfilled], in my office today. [Please see my note below.] : Please see my note below. [Consult Closing:] : Thank you very much for allowing me to participate in the care of this patient.  If you have any questions, please do not hesitate to contact me. [Sincerely,] : Sincerely, [FreeTextEntry3] : Yael Allen MD  [employer] at [facility] Pulmonary, Critical Care, and Sleep Medicine

## 2024-09-12 NOTE — ASSESSMENT
[FreeTextEntry1] : Reviewed: Radiology: - CT Chest (8/23): waxing/waning appearance of infectious airway disease, with improvement in patchy areas of groundglass opacity and consolidation in RLL and CESAR and worsening of groundglass opacities in RUL and tree in bud opacities in LLL - CT chest (5/23): Overall increase in small and large airway disease worsening with foci of parenchymal opacities in RLL - CT chest (11/21):Mild interval worsening of patchy airspace and branching "tree in bud" opacities, most prominent in the right upper and right middle lobes, likely infectious or inflammatory in etiology. Persistent RML and Lingula bronchiectasis (no change) - CT chest (8/21): Bilateral RML, lingula bronchiectasis (stable). New focus of small airway infection in the peripheral upper lobe with tree in bud nodules with slightly more focal/dominant central nodule measuring 7 mm. - CT chest (2019): RML and lingula bronchiectasis  Lung function tests: - PFT (5/9/2024): FEV1 69, FEV1/FVC 95, TLC 79, DLCO 72 - 6MWT (5/9/2023): 97% on RA; 95% after walking 536 meters - PFT (8 /22): FEV1 66, FEV1/FVC 71, TLC 78, DLCO 67 - Six minute walk test (8/22): 94% on RA, HR increased from 68 to 104/minute with exertion. - PFT (8 /21 ): FEV1 71, FEV1/FVC 73, TLC 76, DLCO 69, Rev 2% - 6 minute walk test (8/21): 96% on RA with ambulation  Microbiology: - Sputum Culture (6/23): AFB: MAC (Sensitive to amikacin, clarithromycin) - Sputum culture results (7/21): AFB : MAC (sensitivity: Sensitive to rifampin and ethambutol, synergistic (ethambutol and rifampin). Clarithromycin (16, intermediate sensitivity) Bacterial: normal estella Fungus: Rare aspergillus niger - Sputum culture results (2018): Sputum AFB 1/30/18: no growth at 6 weeks Sputum C&S 1/30/18: numerous mucin producing Pseudomonas aeruginosa Sputum C&S 2/22/18: numerous mucin producing Pseudomonas aeruginosa Sputum C&2 11/23: + Proteaus mirabilis AFB 11/23: negative Fungal Culture 11/23: rare yeast Sputum Culture 1/24: P mirabilis AFB 1/24: no growth at 6 weeks  Labs for bronchiectasis: All labs for bronchiectasis were noncontributory  64-year-old female with h/o MAC (s/p treatment with ethambutol and azithromycin for 1 year in 2011), presenting with persistent sputum positive for MAC with h/o pseudomonas in sputum, resumed RIF/Azith/ETB (9/8/23-). AFB negative since 11/2023.  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 Clarithromycin VENU 16 (intermediate sen). Sensitive to rifampin and ethambutol, synergistic (ethambutol and rifampin). - Repeat CT chest in May 2023 showed worsening bronchiectasis and tree in bud opacities - Persistent cough and expectoration, particularly in the evenings. - PFTs in May 2024 stable Plan: - Started on Rif/Azithro/Ethambutol 9/2023; managed by Dr. Ibrahim; plan for >12 months therapy since 11/2023, may extend to ~18 months if patient tolerates it given this is her second round of treatment.  - No current tissues with therapy - Following with ophthalmology for vision; advised to establish care with ENT for hearing eval - Continue BID hypertonic saline and aerobika  - Stop Breo given no improvement.   (2) Allergic Rhinitis: - Resolved. No longer taking Flonase nasal spray.   Follow up in 4 months.

## 2024-09-18 ENCOUNTER — TRANSCRIPTION ENCOUNTER (OUTPATIENT)
Age: 64
End: 2024-09-18

## 2024-10-29 ENCOUNTER — APPOINTMENT (OUTPATIENT)
Dept: INFECTIOUS DISEASE | Facility: CLINIC | Age: 64
End: 2024-10-29
Payer: COMMERCIAL

## 2024-10-29 VITALS
WEIGHT: 117 LBS | DIASTOLIC BLOOD PRESSURE: 80 MMHG | HEART RATE: 84 BPM | SYSTOLIC BLOOD PRESSURE: 141 MMHG | BODY MASS INDEX: 19.49 KG/M2 | OXYGEN SATURATION: 96 % | TEMPERATURE: 97.2 F | HEIGHT: 65 IN

## 2024-10-29 DIAGNOSIS — J47.9 BRONCHIECTASIS, UNCOMPLICATED: ICD-10-CM

## 2024-10-29 DIAGNOSIS — A31.0 PULMONARY MYCOBACTERIAL INFECTION: ICD-10-CM

## 2024-10-29 DIAGNOSIS — R91.1 SOLITARY PULMONARY NODULE: ICD-10-CM

## 2024-10-29 DIAGNOSIS — R06.02 SHORTNESS OF BREATH: ICD-10-CM

## 2024-10-29 PROCEDURE — G2211 COMPLEX E/M VISIT ADD ON: CPT

## 2024-10-29 PROCEDURE — 99214 OFFICE O/P EST MOD 30 MIN: CPT

## 2024-10-30 LAB
ALBUMIN SERPL ELPH-MCNC: 4.4 G/DL
ALP BLD-CCNC: 59 U/L
ALT SERPL-CCNC: 13 U/L
ANION GAP SERPL CALC-SCNC: 11 MMOL/L
AST SERPL-CCNC: 19 U/L
BASOPHILS # BLD AUTO: 0.09 K/UL
BASOPHILS NFR BLD AUTO: 1.8 %
BILIRUB SERPL-MCNC: 0.4 MG/DL
BUN SERPL-MCNC: 11 MG/DL
CALCIUM SERPL-MCNC: 9.8 MG/DL
CHLORIDE SERPL-SCNC: 102 MMOL/L
CO2 SERPL-SCNC: 24 MMOL/L
CREAT SERPL-MCNC: 0.76 MG/DL
EGFR: 89 ML/MIN/1.73M2
EOSINOPHIL # BLD AUTO: 0.1 K/UL
EOSINOPHIL NFR BLD AUTO: 2 %
GLUCOSE SERPL-MCNC: 104 MG/DL
HCT VFR BLD CALC: 41.6 %
HGB BLD-MCNC: 14 G/DL
IMM GRANULOCYTES NFR BLD AUTO: 0.2 %
LYMPHOCYTES # BLD AUTO: 1.26 K/UL
LYMPHOCYTES NFR BLD AUTO: 25.6 %
MAN DIFF?: NORMAL
MCHC RBC-ENTMCNC: 31.3 PG
MCHC RBC-ENTMCNC: 33.7 G/DL
MCV RBC AUTO: 93.1 FL
MONOCYTES # BLD AUTO: 0.38 K/UL
MONOCYTES NFR BLD AUTO: 7.7 %
NEUTROPHILS # BLD AUTO: 3.08 K/UL
NEUTROPHILS NFR BLD AUTO: 62.7 %
PLATELET # BLD AUTO: 281 K/UL
POTASSIUM SERPL-SCNC: 4.7 MMOL/L
PROT SERPL-MCNC: 7.3 G/DL
RBC # BLD: 4.47 M/UL
RBC # FLD: 12 %
SODIUM SERPL-SCNC: 137 MMOL/L
WBC # FLD AUTO: 4.92 K/UL

## 2024-11-02 ENCOUNTER — APPOINTMENT (OUTPATIENT)
Dept: CT IMAGING | Facility: HOSPITAL | Age: 64
End: 2024-11-02

## 2024-11-02 ENCOUNTER — OUTPATIENT (OUTPATIENT)
Dept: OUTPATIENT SERVICES | Facility: HOSPITAL | Age: 64
LOS: 1 days | End: 2024-11-02
Payer: COMMERCIAL

## 2024-11-02 PROCEDURE — 71250 CT THORAX DX C-: CPT | Mod: 26

## 2024-11-02 PROCEDURE — 71250 CT THORAX DX C-: CPT

## 2024-12-19 ENCOUNTER — APPOINTMENT (OUTPATIENT)
Dept: PULMONOLOGY | Facility: CLINIC | Age: 64
End: 2024-12-19

## 2025-01-16 ENCOUNTER — APPOINTMENT (OUTPATIENT)
Dept: INFECTIOUS DISEASE | Facility: CLINIC | Age: 65
End: 2025-01-16
Payer: COMMERCIAL

## 2025-01-16 ENCOUNTER — APPOINTMENT (OUTPATIENT)
Dept: PULMONOLOGY | Facility: CLINIC | Age: 65
End: 2025-01-16
Payer: COMMERCIAL

## 2025-01-16 ENCOUNTER — NON-APPOINTMENT (OUTPATIENT)
Age: 65
End: 2025-01-16

## 2025-01-16 VITALS
WEIGHT: 112 LBS | DIASTOLIC BLOOD PRESSURE: 82 MMHG | OXYGEN SATURATION: 92 % | SYSTOLIC BLOOD PRESSURE: 129 MMHG | TEMPERATURE: 98 F | HEIGHT: 65 IN | HEART RATE: 94 BPM | BODY MASS INDEX: 18.66 KG/M2

## 2025-01-16 VITALS
WEIGHT: 112 LBS | BODY MASS INDEX: 18.66 KG/M2 | HEIGHT: 65 IN | TEMPERATURE: 98 F | OXYGEN SATURATION: 96 % | HEART RATE: 85 BPM | SYSTOLIC BLOOD PRESSURE: 128 MMHG | DIASTOLIC BLOOD PRESSURE: 78 MMHG

## 2025-01-16 DIAGNOSIS — A31.0 PULMONARY MYCOBACTERIAL INFECTION: ICD-10-CM

## 2025-01-16 DIAGNOSIS — R06.02 SHORTNESS OF BREATH: ICD-10-CM

## 2025-01-16 DIAGNOSIS — R05.9 COUGH, UNSPECIFIED: ICD-10-CM

## 2025-01-16 DIAGNOSIS — R91.1 SOLITARY PULMONARY NODULE: ICD-10-CM

## 2025-01-16 DIAGNOSIS — J47.9 BRONCHIECTASIS, UNCOMPLICATED: ICD-10-CM

## 2025-01-16 PROCEDURE — 99214 OFFICE O/P EST MOD 30 MIN: CPT

## 2025-01-16 PROCEDURE — G2211 COMPLEX E/M VISIT ADD ON: CPT

## 2025-01-16 RX ORDER — AZELASTINE HYDROCHLORIDE 137 UG/1
0.1 SPRAY, METERED NASAL TWICE DAILY
Qty: 2 | Refills: 3 | Status: ACTIVE | COMMUNITY
Start: 2025-01-16 | End: 1900-01-01

## 2025-01-16 RX ORDER — ALBUTEROL SULFATE 0.63 MG/3ML
0.63 SOLUTION RESPIRATORY (INHALATION)
Qty: 90 | Refills: 3 | Status: ACTIVE | COMMUNITY
Start: 2025-01-16 | End: 1900-01-01

## 2025-01-17 LAB
ALBUMIN SERPL ELPH-MCNC: 4.2 G/DL
ALP BLD-CCNC: 59 U/L
ALT SERPL-CCNC: 13 U/L
ANION GAP SERPL CALC-SCNC: 14 MMOL/L
AST SERPL-CCNC: 17 U/L
BASOPHILS # BLD AUTO: 0.06 K/UL
BASOPHILS NFR BLD AUTO: 0.9 %
BILIRUB SERPL-MCNC: 0.2 MG/DL
BUN SERPL-MCNC: 11 MG/DL
CALCIUM SERPL-MCNC: 9.5 MG/DL
CHLORIDE SERPL-SCNC: 101 MMOL/L
CO2 SERPL-SCNC: 23 MMOL/L
CREAT SERPL-MCNC: 0.64 MG/DL
EGFR: 100 ML/MIN/1.73M2
EOSINOPHIL # BLD AUTO: 0.07 K/UL
EOSINOPHIL NFR BLD AUTO: 1 %
GLUCOSE SERPL-MCNC: 89 MG/DL
HCT VFR BLD CALC: 40.2 %
HGB BLD-MCNC: 13.6 G/DL
IMM GRANULOCYTES NFR BLD AUTO: 0.3 %
LYMPHOCYTES # BLD AUTO: 1.13 K/UL
LYMPHOCYTES NFR BLD AUTO: 16.4 %
MAN DIFF?: NORMAL
MCHC RBC-ENTMCNC: 31.9 PG
MCHC RBC-ENTMCNC: 33.8 G/DL
MCV RBC AUTO: 94.1 FL
MONOCYTES # BLD AUTO: 0.44 K/UL
MONOCYTES NFR BLD AUTO: 6.4 %
NEUTROPHILS # BLD AUTO: 5.17 K/UL
NEUTROPHILS NFR BLD AUTO: 75 %
PLATELET # BLD AUTO: 363 K/UL
POTASSIUM SERPL-SCNC: 4.5 MMOL/L
PROCALCITONIN SERPL-MCNC: 0.03 NG/ML
PROT SERPL-MCNC: 6.9 G/DL
RBC # BLD: 4.27 M/UL
RBC # FLD: 12 %
SODIUM SERPL-SCNC: 138 MMOL/L
WBC # FLD AUTO: 6.89 K/UL

## 2025-01-21 ENCOUNTER — OUTPATIENT (OUTPATIENT)
Dept: OUTPATIENT SERVICES | Facility: HOSPITAL | Age: 65
LOS: 1 days | End: 2025-01-21
Payer: COMMERCIAL

## 2025-01-21 PROCEDURE — 71046 X-RAY EXAM CHEST 2 VIEWS: CPT

## 2025-01-21 PROCEDURE — 71046 X-RAY EXAM CHEST 2 VIEWS: CPT | Mod: 26

## 2025-01-22 ENCOUNTER — NON-APPOINTMENT (OUTPATIENT)
Age: 65
End: 2025-01-22

## 2025-01-25 LAB — ACID FAST STN SPT: NORMAL

## 2025-01-26 LAB
BACTERIA SPT CULT: NORMAL
FUNGUS SPT CULT: NORMAL

## 2025-01-27 ENCOUNTER — NON-APPOINTMENT (OUTPATIENT)
Age: 65
End: 2025-01-27

## 2025-01-31 NOTE — ED ADULT NURSE NOTE - DISCHARGE DATE/TIME
2025       Frannie Goss CNP  [address] [facility] [id number]  Via In Basket      Patient: Reena Elias   YOB: 2018   Date of Visit: 2025       Dear Dr. Goss:    Thank you for referring Reena Elias to me for evaluation. Below are my notes for this visit with her.    If you have questions, please do not hesitate to call me. I look forward to following your patient along with you.      Sincerely,        Jessenia Garcia MD        CC: No Recipients    Jessenia Garcia MD  2025  4:09 PM  Signed  [facility]  Pediatric Hematology/Oncology Follow-up Visit       NAME:   Reena Elias  MRN:   [id number]  :   2018  Date of Service: 2025    Primary Care Physician: Frannie Goss CNP    History obtained from: mother     used: not applicable    Chief Complaint:   Follow up iron deficiency anemia     History of Present Illness:    Reena is a 7 y/o female with iron deficiency secondary to inadequate dietary intake.  She has been followed in hematology clinic and taking oral iron since May 2024.  Her anemia resolved several visits ago but persisted in having low ferritin levels.  She as last seen 24.  She comes for follow-up monitoring of her response to iron therapy.  She is accompanied by her mother.      Mom reports she's been taking her iron daily.  She doesn't care for it but she does take it.  She's had no upset stomach, no vomiting, and no constipation.    Mom has no other c/o.    ROS is otherwise negative.      Past Medical History  Past Medical History:   Diagnosis Date   • Iron deficiency anemia    • No pertinent past medical history        Past Surgical History:   Procedure Laterality Date   • No past surgeries         Immunizations  Immunization History   Administered Date(s) Administered   • DTaP 2022   • DTaP/Hep B/IPV 2020, 2021, 2021   • Hep A, ped/adol, 2 dose 2022, 2022   •  Hep B, adolescent or pediatric 2020   • Hib (PRP-OMP) 2020, 01/28/2021, 04/08/2022   • MMR 04/08/2022   • Pneumococcal conjugate PCV 13 2020, 01/28/2021, 03/23/2021, 04/08/2022   • Rotavirus - monovalent 2020, 01/28/2021   • Varicella 04/08/2022       Flu shot documented within the past year: no     Allergies  ALLERGIES:  No Known Allergies    Home Medications  Current Outpatient Medications   Medication Sig Dispense Refill   • ferrous sulfate 220 (44 Fe) MG/5ML solution Take 5 mLs by mouth daily. 150 mL 5   • polyethylene glycol (MIRALAX) 17 g packet Take 17 g by mouth daily. Stir and dissolve powder in any 4 to 8 ounces of beverage, then drink.     • cetirizine (ZyrTEC) 5 MG/5ML solution Take 2.5 mLs by mouth daily. 120 mL 0     No current facility-administered medications for this encounter.       Family History  Family History   Problem Relation Age of Onset   • Patient is unaware of any medical problems Mother    • Patient is unaware of any medical problems Father    • Patient is unaware of any medical problems Brother        Pediatric History   Patient Parents   • MESFIN ZARATE (Father)   • ELVI TYSON (Mother)     Other Topics Concern   • Not on file   Social History Narrative   • Not on file    Lives with mom, dad, 2 sibs.    Review of Systems  Review of Systems   Gastrointestinal:  Negative for abdominal pain, constipation and vomiting.   All other systems reviewed and are negative.    Vitals  Visit Vitals  /65 (BP Location: LUE - Left upper extremity, Patient Position: Sitting)   Pulse 113   Temp 97.9 °F (36.6 °C) (Oral)   Resp 26   Ht 102.4 cm   Wt 18.9 kg   SpO2 99%   BMI 18.02 kg/m²     Body surface area is 0.72 meters squared.  Body mass index is 18.02 kg/m².    Physical Exam  [place]: 100: fully active, normal   Physical Exam  Vitals reviewed.   Constitutional:       General: She is active.      Appearance: Normal appearance. She is well-developed and normal weight.       Comments: Dressed completely in pink/purple with pink glasses and matches her mother, cooperative, super cute   HENT:      Head: Normocephalic.      Right Ear: Tympanic membrane normal.      Left Ear: Tympanic membrane normal.      Nose: Nose normal.      Mouth/Throat:      Mouth: Mucous membranes are moist.      Pharynx: Oropharynx is clear.      Neck: Neck supple.   Eyes:      Conjunctiva/sclera: Conjunctivae normal.   Cardiovascular:      Rate and Rhythm: Normal rate.      Pulses: Normal pulses.   Pulmonary:      Effort: Pulmonary effort is normal.      Breath sounds: Normal breath sounds.   Abdominal:      General: There is no distension.      Palpations: Abdomen is soft. There is no hepatomegaly or splenomegaly.      Tenderness: There is no abdominal tenderness. There is no guarding.   Musculoskeletal:         General: No swelling or deformity.   Skin:     General: Skin is warm.      Capillary Refill: Capillary refill takes less than 2 seconds.      Coloration: Skin is not jaundiced or pale.      Findings: No rash.   Neurological:      General: No focal deficit present.      Mental Status: She is alert.       Labs   Component      Latest Ref [facility] 11/21/2024  11:43 AM 1/31/2025  11:10 AM   WBC      6.0 - 17.0 K/mcL 5.2 (L)  6.7    RBC      3.90 - 5.30 mil/mcL 5.37 (H)  4.99    HGB      11.5 - 13.5 g/dL 15.4 (H)  14.2 (H)    HCT      34.0 - 40.0 % 44.1 (H)  41.2 (H)    MCV      70.0 - 86.0 fl 82.1  82.6    MCH      24.0 - 30.0 pg 28.7  28.5    MCHC      30.0 - 36.0 g/dL 34.9  34.5    RDW-CV      11.0 - 15.0 % 12.6  12.7    RDW-SD      35.0 - 47.0 fL 37.8  37.7    PLT      140 - 450 K/mcL 304  222    NRBC      <=0 /100 WBC 0  0    Neutrophil      % 43  55    LYMPH      % 45  30    MONO      % 10  13    EOSIN      % 2  1    BASO      % 0  0    Immature Granulocytes      % 0  1    Absolute Neutrophil      1.5 - 8.5 K/mcL 2.2  3.7    Absolute Lymph      2.0 - 8.0 K/mcL 2.4  2.0    Absolute Mono      0.0 - 0.8  K/mcL 0.5  0.8    Absolute Eos      0.0 - 0.7 K/mcL 0.1  0.1    Absolute Baso      0.0 - 0.2 K/mcL 0.0  0.0    Absolute Immature Granulocytes      0.0 - 0.2 K/mcL 0.0  0.0    Ferritin      22 - 158 ng/mL 16 (L)  59       Legend:  (L) Low  (H) High        Assessment  Reena Elias is a 6 year old female with h/o iron deficiency anemia with normal hemoglobin since July but ferritin was persistently low.  Has continued on oral iron therapy and returns today for reassessment.  Ferritin is now normal at 59 ng/mL.    Recommendations:  -May discontinue FeSO4  -Start [place] with iron 1 tablet daily  -Follow-up only as needed    Discussed my findings, impressions, and recommendations with this family.  All questions were answered and all voiced understanding.    Thank you for allowing us to participate in the care of this very nice family.  If you have any questions or concerns please contact the Pediatric Hematology Oncology Clinic at [facility] at [phone].    Jessenia Short MD  Pediatric Hematology Oncology  [facility]     02-Nov-2021 02:17

## 2025-02-01 ENCOUNTER — NON-APPOINTMENT (OUTPATIENT)
Age: 65
End: 2025-02-01

## 2025-02-10 ENCOUNTER — EMERGENCY (EMERGENCY)
Facility: HOSPITAL | Age: 65
LOS: 1 days | Discharge: HOME | End: 2025-02-10
Attending: EMERGENCY MEDICINE
Payer: COMMERCIAL

## 2025-02-10 VITALS
DIASTOLIC BLOOD PRESSURE: 70 MMHG | TEMPERATURE: 99 F | SYSTOLIC BLOOD PRESSURE: 121 MMHG | OXYGEN SATURATION: 99 % | RESPIRATION RATE: 18 BRPM | HEART RATE: 82 BPM

## 2025-02-10 VITALS
DIASTOLIC BLOOD PRESSURE: 68 MMHG | WEIGHT: 111.99 LBS | SYSTOLIC BLOOD PRESSURE: 114 MMHG | HEIGHT: 65 IN | OXYGEN SATURATION: 98 % | HEART RATE: 100 BPM | RESPIRATION RATE: 18 BRPM | TEMPERATURE: 100 F

## 2025-02-10 LAB
ANION GAP SERPL CALC-SCNC: 10 MMOL/L — SIGNIFICANT CHANGE UP (ref 5–17)
BASOPHILS # BLD AUTO: 0.02 K/UL — SIGNIFICANT CHANGE UP (ref 0–0.2)
BASOPHILS NFR BLD AUTO: 0.3 % — SIGNIFICANT CHANGE UP (ref 0–2)
BUN SERPL-MCNC: 7 MG/DL — SIGNIFICANT CHANGE UP (ref 7–18)
CALCIUM SERPL-MCNC: 8.7 MG/DL — SIGNIFICANT CHANGE UP (ref 8.4–10.5)
CHLORIDE SERPL-SCNC: 97 MMOL/L — SIGNIFICANT CHANGE UP (ref 96–108)
CO2 SERPL-SCNC: 24 MMOL/L — SIGNIFICANT CHANGE UP (ref 22–31)
CREAT SERPL-MCNC: 0.64 MG/DL — SIGNIFICANT CHANGE UP (ref 0.5–1.3)
EGFR: 100 ML/MIN/1.73M2 — SIGNIFICANT CHANGE UP
EOSINOPHIL # BLD AUTO: 0 K/UL — SIGNIFICANT CHANGE UP (ref 0–0.5)
EOSINOPHIL NFR BLD AUTO: 0 % — SIGNIFICANT CHANGE UP (ref 0–6)
FLUAV AG NPH QL: DETECTED
FLUBV AG NPH QL: SIGNIFICANT CHANGE UP
GLUCOSE SERPL-MCNC: 98 MG/DL — SIGNIFICANT CHANGE UP (ref 70–99)
HCT VFR BLD CALC: 38 % — SIGNIFICANT CHANGE UP (ref 34.5–45)
HGB BLD-MCNC: 13 G/DL — SIGNIFICANT CHANGE UP (ref 11.5–15.5)
IMM GRANULOCYTES NFR BLD AUTO: 0.3 % — SIGNIFICANT CHANGE UP (ref 0–0.9)
LYMPHOCYTES # BLD AUTO: 0.6 K/UL — LOW (ref 1–3.3)
LYMPHOCYTES # BLD AUTO: 7.5 % — LOW (ref 13–44)
MCHC RBC-ENTMCNC: 31.2 PG — SIGNIFICANT CHANGE UP (ref 27–34)
MCHC RBC-ENTMCNC: 34.2 G/DL — SIGNIFICANT CHANGE UP (ref 32–36)
MCV RBC AUTO: 91.1 FL — SIGNIFICANT CHANGE UP (ref 80–100)
MONOCYTES # BLD AUTO: 0.7 K/UL — SIGNIFICANT CHANGE UP (ref 0–0.9)
MONOCYTES NFR BLD AUTO: 8.8 % — SIGNIFICANT CHANGE UP (ref 2–14)
NEUTROPHILS # BLD AUTO: 6.63 K/UL — SIGNIFICANT CHANGE UP (ref 1.8–7.4)
NEUTROPHILS NFR BLD AUTO: 83.1 % — HIGH (ref 43–77)
NRBC # BLD: 0 /100 WBCS — SIGNIFICANT CHANGE UP (ref 0–0)
NRBC BLD-RTO: 0 /100 WBCS — SIGNIFICANT CHANGE UP (ref 0–0)
PLATELET # BLD AUTO: 171 K/UL — SIGNIFICANT CHANGE UP (ref 150–400)
POTASSIUM SERPL-MCNC: 3.8 MMOL/L — SIGNIFICANT CHANGE UP (ref 3.5–5.3)
POTASSIUM SERPL-SCNC: 3.8 MMOL/L — SIGNIFICANT CHANGE UP (ref 3.5–5.3)
RBC # BLD: 4.17 M/UL — SIGNIFICANT CHANGE UP (ref 3.8–5.2)
RBC # FLD: 11.3 % — SIGNIFICANT CHANGE UP (ref 10.3–14.5)
RSV RNA NPH QL NAA+NON-PROBE: SIGNIFICANT CHANGE UP
SARS-COV-2 RNA SPEC QL NAA+PROBE: SIGNIFICANT CHANGE UP
SODIUM SERPL-SCNC: 131 MMOL/L — LOW (ref 135–145)
WBC # BLD: 7.97 K/UL — SIGNIFICANT CHANGE UP (ref 3.8–10.5)
WBC # FLD AUTO: 7.97 K/UL — SIGNIFICANT CHANGE UP (ref 3.8–10.5)

## 2025-02-10 PROCEDURE — 99284 EMERGENCY DEPT VISIT MOD MDM: CPT

## 2025-02-10 PROCEDURE — 99284 EMERGENCY DEPT VISIT MOD MDM: CPT | Mod: 25

## 2025-02-10 PROCEDURE — 87637 SARSCOV2&INF A&B&RSV AMP PRB: CPT

## 2025-02-10 PROCEDURE — 85025 COMPLETE CBC W/AUTO DIFF WBC: CPT

## 2025-02-10 PROCEDURE — 96374 THER/PROPH/DIAG INJ IV PUSH: CPT

## 2025-02-10 PROCEDURE — 71046 X-RAY EXAM CHEST 2 VIEWS: CPT | Mod: 26

## 2025-02-10 PROCEDURE — 96361 HYDRATE IV INFUSION ADD-ON: CPT

## 2025-02-10 PROCEDURE — 36415 COLL VENOUS BLD VENIPUNCTURE: CPT

## 2025-02-10 PROCEDURE — 93005 ELECTROCARDIOGRAM TRACING: CPT

## 2025-02-10 PROCEDURE — 0241U: CPT

## 2025-02-10 PROCEDURE — 80048 BASIC METABOLIC PNL TOTAL CA: CPT

## 2025-02-10 PROCEDURE — 93010 ELECTROCARDIOGRAM REPORT: CPT

## 2025-02-10 PROCEDURE — 71046 X-RAY EXAM CHEST 2 VIEWS: CPT

## 2025-02-10 RX ORDER — BACTERIOSTATIC SODIUM CHLORIDE 0.9 %
1000 VIAL (ML) INJECTION ONCE
Refills: 0 | Status: COMPLETED | OUTPATIENT
Start: 2025-02-10 | End: 2025-02-10

## 2025-02-10 RX ORDER — OSELTAMIVIR PHOSPHATE 75 MG/1
1 CAPSULE ORAL
Qty: 14 | Refills: 0
Start: 2025-02-10 | End: 2025-02-16

## 2025-02-10 RX ORDER — ONDANSETRON 4 MG/1
4 TABLET, ORALLY DISINTEGRATING ORAL ONCE
Refills: 0 | Status: COMPLETED | OUTPATIENT
Start: 2025-02-10 | End: 2025-02-10

## 2025-02-10 RX ADMIN — Medication 1000 MILLILITER(S): at 11:29

## 2025-02-10 RX ADMIN — Medication 1000 MILLILITER(S): at 10:29

## 2025-02-10 RX ADMIN — ONDANSETRON 4 MILLIGRAM(S): 4 TABLET, ORALLY DISINTEGRATING ORAL at 10:29

## 2025-02-10 NOTE — ED PROVIDER NOTE - PROGRESS NOTE DETAILS
Patient found to have influenza A.  Labs not otherwise emergently actionable.  Chest x-ray appears stable on my independent interpretation of the images.  Will prescribe oseltamavir and recommend close follow-up with PMD.

## 2025-02-10 NOTE — ED PROVIDER NOTE - OBJECTIVE STATEMENT
63yo F with hx of bronchiectasis and MAC infection (on rifampin, ethambutol, and azithromycin) presenting for 3 days of cough, fever, and chills. Endorses chest pain with lying flat only, not worse with breathing, non radiating. Endorses decreased PO tolerance and nausea.  States she has a non productive cough but does not feel short of breath or difficulty breathing. States her  was recently ill.

## 2025-02-10 NOTE — ED PROVIDER NOTE - NS ED ROS FT
REVIEW OF SYSTEMS:    CONSTITUTIONAL: No weakness +fever +chills +chest heaviness with lying flat  EYES/ENT: No visual changes;  No vertigo or throat pain   NECK: No pain or stiffness  RESPIRATORY: No shortness of breath +Cough   CARDIOVASCULAR: No palpitations +chest heaviness with lying flat  GASTROINTESTINAL: No abdominal or epigastric pain. +Nausea +decreased PO  GENITOURINARY: No dysuria, frequency or hematuria  NEUROLOGICAL: No numbness or weakness  SKIN: No itching, rashes Statement Selected

## 2025-02-10 NOTE — ED PROVIDER NOTE - PHYSICAL EXAMINATION
T(C): 37.6 (02-10-25 @ 09:33), Max: 37.6 (02-10-25 @ 09:33)  HR: 100 (02-10-25 @ 09:33) (100 - 100)  BP: 114/68 (02-10-25 @ 09:33) (114/68 - 114/68)  RR: 18 (02-10-25 @ 09:33) (18 - 18)  SpO2: 98% (02-10-25 @ 09:33) (98% - 98%)    CONSTITUTIONAL: Well groomed, Coughing intermittently  EYES: No conjunctival or scleral injection, non-icteric  RESP: No apparent respiratory distress, no use of accessory muscles. Fine rales bilaterally on end-expiration at lung bases  CV: RRR, +S1S2,  no peripheral edema  GI: Soft, NT, ND, no rebound, no guarding

## 2025-02-10 NOTE — ED PROVIDER NOTE - PATIENT PORTAL LINK FT
You can access the FollowMyHealth Patient Portal offered by [facility] by registering at the following website: [email or web address]. By joining [employer]’s FollowMyHealth portal, you will also be able to view your health information using other applications (apps) compatible with our system.

## 2025-02-10 NOTE — ED PROVIDER NOTE - CLINICAL SUMMARY MEDICAL DECISION MAKING FREE TEXT BOX
65yo F with hx of bronchiectasis and MAC infection (on rifampin, ethambutol, and azithromycin) presenting for 3 days of cough, fever, and chills. Endorses chest pain with lying flat only, not worse with breathing, non radiating. Endorses decreased PO tolerance and nausea. Not in obvious respiratory distress, sating well on RA.     Cough and fever likely 2/2 to viral infection, given pulmonary hx lower threshold for more serious infectious process. Will check flu/covid, CXR, labs. Symptomatic management for now. 65yo F with hx of bronchiectasis and MAC infection (on rifampin, ethambutol, and azithromycin) presenting for 3 days of cough, fever, and chills. Endorses chest pain with lying flat only, not worse with breathing, non radiating. Endorses decreased PO tolerance and nausea. Not in obvious respiratory distress, sating well on RA.     Cough and fever likely 2/2 to viral infection, given pulmonary hx lower threshold for more serious infectious process. Will check flu/covid, CXR, labs. Symptomatic management for now.    Attd: Patient with a history of bronchiectasis presenting with symptoms suggestive of a respiratory infection.  Could be viral given sick contacts however given her baseline pulmonary disease will obtain flu COVID and RSV swab along with labs, chest x-ray to screen for pneumonia and treat with fluids and antiemetics

## 2025-02-10 NOTE — ED PROVIDER NOTE - NSFOLLOWUPINSTRUCTIONS_ED_ALL_ED_FT
Thank you for choosing [facility] for your healthcare.    You were seen in the Emergency Department for cough fevers and chills and found to have influenza A on testing the emergency department.  You also had blood work and a chest x-ray which did not show any evidence of a new pneumonia, significant dehydration or other emergent process at this time.  We are prescribing you Tamiflu to take for your influenza symptoms.  If you start developing significant stomach discomfort or nausea after starting this medication it is okay to stop it as this is a known side effect of Tamiflu.  Otherwise if you are tolerating it well please take it as prescribed.  Please return to the emergency department if you are having significant difficulty breathing or uncontrollable vomiting.  Please follow-up with your primary care doctor if you are not feeling better within approximately 7 to 10 days of the onset of your symptoms is most people with the flu are feeling better by this time.

## 2025-02-10 NOTE — ED ADULT NURSE NOTE - NSFALLUNIVINTERV_ED_ALL_ED
Bed/Stretcher in lowest position, wheels locked, appropriate side rails in place/Call bell, personal items and telephone in reach/Instruct patient to call for assistance before getting out of bed/chair/stretcher/Non-slip footwear applied when patient is off stretcher/[place] to call system/Physically safe environment - no spills, clutter or unnecessary equipment/Purposeful proactive rounding/Room/bathroom lighting operational, light cord in reach

## 2025-02-10 NOTE — ED POST DISCHARGE NOTE - RESULT SUMMARY
CXR with Increased interstitial markings seen predominantly in the  right infrahilar region and to a lesser extent in the left infrahilar  region but slightly denser than before compatible with some progressive changes, pt is +flu, called and d/w pt she has hx of chronic lung issues, has pulm and ID appts scheduled, advised repeat CXR, ED return precautions also discussed  Annie Leslie PA-C

## 2025-02-10 NOTE — ED ADULT NURSE NOTE - OBJECTIVE STATEMENT
AOX4 +ambulatory patient reports flu like symptoms and chest pain when laying flat. Patient noted with coughing complaining of nausea. no SOB

## 2025-02-20 ENCOUNTER — APPOINTMENT (OUTPATIENT)
Dept: INFECTIOUS DISEASE | Facility: CLINIC | Age: 65
End: 2025-02-20

## 2025-03-13 ENCOUNTER — APPOINTMENT (OUTPATIENT)
Dept: OTOLARYNGOLOGY | Facility: CLINIC | Age: 65
End: 2025-03-13
Payer: COMMERCIAL

## 2025-03-13 ENCOUNTER — APPOINTMENT (OUTPATIENT)
Dept: PULMONOLOGY | Facility: CLINIC | Age: 65
End: 2025-03-13

## 2025-03-13 VITALS
BODY MASS INDEX: 18.66 KG/M2 | WEIGHT: 112 LBS | SYSTOLIC BLOOD PRESSURE: 154 MMHG | HEART RATE: 81 BPM | DIASTOLIC BLOOD PRESSURE: 80 MMHG | HEIGHT: 65 IN

## 2025-03-13 DIAGNOSIS — R05.3 CHRONIC COUGH: ICD-10-CM

## 2025-03-13 DIAGNOSIS — J47.9 BRONCHIECTASIS, UNCOMPLICATED: ICD-10-CM

## 2025-03-13 DIAGNOSIS — H91.90 UNSPECIFIED HEARING LOSS, UNSPECIFIED EAR: ICD-10-CM

## 2025-03-13 PROCEDURE — 92567 TYMPANOMETRY: CPT

## 2025-03-13 PROCEDURE — 31575 DIAGNOSTIC LARYNGOSCOPY: CPT

## 2025-03-13 PROCEDURE — 99204 OFFICE O/P NEW MOD 45 MIN: CPT | Mod: 25

## 2025-03-13 PROCEDURE — 92557 COMPREHENSIVE HEARING TEST: CPT

## 2025-03-27 ENCOUNTER — APPOINTMENT (OUTPATIENT)
Dept: INFECTIOUS DISEASE | Facility: CLINIC | Age: 65
End: 2025-03-27
Payer: COMMERCIAL

## 2025-03-27 VITALS
BODY MASS INDEX: 18.49 KG/M2 | OXYGEN SATURATION: 97 % | HEART RATE: 85 BPM | DIASTOLIC BLOOD PRESSURE: 76 MMHG | WEIGHT: 111 LBS | SYSTOLIC BLOOD PRESSURE: 148 MMHG | HEIGHT: 65 IN | TEMPERATURE: 98.1 F

## 2025-03-27 DIAGNOSIS — J47.9 BRONCHIECTASIS, UNCOMPLICATED: ICD-10-CM

## 2025-03-27 DIAGNOSIS — A31.0 PULMONARY MYCOBACTERIAL INFECTION: ICD-10-CM

## 2025-03-27 DIAGNOSIS — R05.9 COUGH, UNSPECIFIED: ICD-10-CM

## 2025-03-27 PROCEDURE — 99214 OFFICE O/P EST MOD 30 MIN: CPT

## 2025-03-27 PROCEDURE — G2211 COMPLEX E/M VISIT ADD ON: CPT

## 2025-03-28 LAB
ALBUMIN SERPL ELPH-MCNC: 4.5 G/DL
ALP BLD-CCNC: 78 U/L
ALT SERPL-CCNC: 13 U/L
ANION GAP SERPL CALC-SCNC: 11 MMOL/L
AST SERPL-CCNC: 19 U/L
BASOPHILS # BLD AUTO: 0.07 K/UL
BASOPHILS NFR BLD AUTO: 1.2 %
BILIRUB SERPL-MCNC: 1.1 MG/DL
BUN SERPL-MCNC: 10 MG/DL
CALCIUM SERPL-MCNC: 9.8 MG/DL
CHLORIDE SERPL-SCNC: 104 MMOL/L
CO2 SERPL-SCNC: 27 MMOL/L
CREAT SERPL-MCNC: 0.69 MG/DL
EGFRCR SERPLBLD CKD-EPI 2021: 98 ML/MIN/1.73M2
EOSINOPHIL # BLD AUTO: 0.19 K/UL
EOSINOPHIL NFR BLD AUTO: 3.3 %
GLUCOSE SERPL-MCNC: 86 MG/DL
HCT VFR BLD CALC: 42 %
HGB BLD-MCNC: 13.6 G/DL
IMM GRANULOCYTES NFR BLD AUTO: 0.3 %
LYMPHOCYTES # BLD AUTO: 1.33 K/UL
LYMPHOCYTES NFR BLD AUTO: 22.9 %
MAN DIFF?: NORMAL
MCHC RBC-ENTMCNC: 30.8 PG
MCHC RBC-ENTMCNC: 32.4 G/DL
MCV RBC AUTO: 95.2 FL
MONOCYTES # BLD AUTO: 0.7 K/UL
MONOCYTES NFR BLD AUTO: 12.1 %
NEUTROPHILS # BLD AUTO: 3.49 K/UL
NEUTROPHILS NFR BLD AUTO: 60.2 %
PLATELET # BLD AUTO: 294 K/UL
POTASSIUM SERPL-SCNC: 4.7 MMOL/L
PROT SERPL-MCNC: 7.2 G/DL
RBC # BLD: 4.41 M/UL
RBC # FLD: 12.8 %
SODIUM SERPL-SCNC: 141 MMOL/L
WBC # FLD AUTO: 5.8 K/UL

## 2025-03-31 LAB
BACTERIA SPT CULT: NORMAL
FUNGITELL QUANTITATIVE VALUE: <31 PG/ML
GALACTOMANNAN AG SERPL QL IA: 0.03 INDEX

## 2025-04-01 LAB — FUNGUS SPT CULT: ABNORMAL

## 2025-04-05 ENCOUNTER — OUTPATIENT (OUTPATIENT)
Dept: OUTPATIENT SERVICES | Facility: HOSPITAL | Age: 65
LOS: 1 days | End: 2025-04-05

## 2025-04-05 ENCOUNTER — APPOINTMENT (OUTPATIENT)
Dept: CT IMAGING | Facility: CLINIC | Age: 65
End: 2025-04-05
Payer: COMMERCIAL

## 2025-04-05 LAB — ACID FAST STN SPT: NORMAL

## 2025-04-05 PROCEDURE — 71250 CT THORAX DX C-: CPT | Mod: 26

## 2025-04-17 ENCOUNTER — APPOINTMENT (OUTPATIENT)
Dept: PULMONOLOGY | Facility: CLINIC | Age: 65
End: 2025-04-17
Payer: COMMERCIAL

## 2025-04-17 VITALS
OXYGEN SATURATION: 99 % | RESPIRATION RATE: 12 BRPM | SYSTOLIC BLOOD PRESSURE: 155 MMHG | HEART RATE: 91 BPM | TEMPERATURE: 97 F | BODY MASS INDEX: 18.83 KG/M2 | HEIGHT: 65 IN | DIASTOLIC BLOOD PRESSURE: 73 MMHG | WEIGHT: 113 LBS

## 2025-04-17 DIAGNOSIS — R06.02 SHORTNESS OF BREATH: ICD-10-CM

## 2025-04-17 DIAGNOSIS — J47.9 BRONCHIECTASIS, UNCOMPLICATED: ICD-10-CM

## 2025-04-17 DIAGNOSIS — R05.3 CHRONIC COUGH: ICD-10-CM

## 2025-04-17 DIAGNOSIS — A31.0 PULMONARY MYCOBACTERIAL INFECTION: ICD-10-CM

## 2025-04-17 PROCEDURE — 99215 OFFICE O/P EST HI 40 MIN: CPT

## 2025-04-18 ENCOUNTER — TRANSCRIPTION ENCOUNTER (OUTPATIENT)
Age: 65
End: 2025-04-18

## 2025-04-22 ENCOUNTER — TRANSCRIPTION ENCOUNTER (OUTPATIENT)
Age: 65
End: 2025-04-22

## 2025-04-22 RX ORDER — MORPHINE SULFATE 15 MG/1
15 TABLET ORAL EVERY 6 HOURS
Qty: 15 | Refills: 0 | Status: ACTIVE | COMMUNITY
Start: 2025-04-17 | End: 1900-01-01

## 2025-05-30 ENCOUNTER — TRANSCRIPTION ENCOUNTER (OUTPATIENT)
Age: 65
End: 2025-05-30

## 2025-06-16 ENCOUNTER — TRANSCRIPTION ENCOUNTER (OUTPATIENT)
Age: 65
End: 2025-06-16

## 2025-06-17 ENCOUNTER — TRANSCRIPTION ENCOUNTER (OUTPATIENT)
Age: 65
End: 2025-06-17

## 2025-06-18 ENCOUNTER — APPOINTMENT (OUTPATIENT)
Dept: INFECTIOUS DISEASE | Facility: CLINIC | Age: 65
End: 2025-06-18
Payer: COMMERCIAL

## 2025-06-18 PROCEDURE — G2211 COMPLEX E/M VISIT ADD ON: CPT

## 2025-06-18 PROCEDURE — G2212 PROLONG OUTPT/OFFICE VIS: CPT

## 2025-06-18 PROCEDURE — 99215 OFFICE O/P EST HI 40 MIN: CPT

## 2025-06-26 ENCOUNTER — APPOINTMENT (OUTPATIENT)
Dept: PULMONOLOGY | Facility: CLINIC | Age: 65
End: 2025-06-26

## 2025-06-30 ENCOUNTER — TRANSCRIPTION ENCOUNTER (OUTPATIENT)
Age: 65
End: 2025-06-30

## 2025-07-03 ENCOUNTER — APPOINTMENT (OUTPATIENT)
Dept: INFECTIOUS DISEASE | Facility: CLINIC | Age: 65
End: 2025-07-03
Payer: COMMERCIAL

## 2025-07-03 PROCEDURE — 99214 OFFICE O/P EST MOD 30 MIN: CPT | Mod: 95

## 2025-07-03 PROCEDURE — G2211 COMPLEX E/M VISIT ADD ON: CPT | Mod: 95

## 2025-07-07 ENCOUNTER — APPOINTMENT (OUTPATIENT)
Dept: PULMONOLOGY | Facility: CLINIC | Age: 65
End: 2025-07-07
Payer: COMMERCIAL

## 2025-07-07 PROCEDURE — G2211 COMPLEX E/M VISIT ADD ON: CPT | Mod: 93

## 2025-07-07 PROCEDURE — 99215 OFFICE O/P EST HI 40 MIN: CPT | Mod: 93

## 2025-07-08 PROBLEM — Z01.818 PREOP TESTING: Status: ACTIVE | Noted: 2025-07-08

## 2025-07-09 LAB
ALBUMIN SERPL ELPH-MCNC: 4.1 G/DL
ALP BLD-CCNC: 62 U/L
ALT SERPL-CCNC: 13 U/L
ANION GAP SERPL CALC-SCNC: 15 MMOL/L
APTT BLD: 33.9 SEC
AST SERPL-CCNC: 23 U/L
BASOPHILS # BLD AUTO: 0.08 K/UL
BASOPHILS NFR BLD AUTO: 1.3 %
BILIRUB SERPL-MCNC: 0.4 MG/DL
BUN SERPL-MCNC: 13 MG/DL
CALCIUM SERPL-MCNC: 9.3 MG/DL
CHLORIDE SERPL-SCNC: 104 MMOL/L
CO2 SERPL-SCNC: 20 MMOL/L
CREAT SERPL-MCNC: 0.68 MG/DL
EGFRCR SERPLBLD CKD-EPI 2021: 98 ML/MIN/1.73M2
EOSINOPHIL # BLD AUTO: 0.21 K/UL
EOSINOPHIL NFR BLD AUTO: 3.5 %
GLUCOSE SERPL-MCNC: 77 MG/DL
HCT VFR BLD CALC: 42.7 %
HGB BLD-MCNC: 14 G/DL
IMM GRANULOCYTES NFR BLD AUTO: 0.3 %
INR PPP: 0.93 RATIO
LYMPHOCYTES # BLD AUTO: 2.13 K/UL
LYMPHOCYTES NFR BLD AUTO: 35 %
MAN DIFF?: NORMAL
MCHC RBC-ENTMCNC: 30.4 PG
MCHC RBC-ENTMCNC: 32.8 G/DL
MCV RBC AUTO: 92.8 FL
MONOCYTES # BLD AUTO: 0.6 K/UL
MONOCYTES NFR BLD AUTO: 9.9 %
NEUTROPHILS # BLD AUTO: 3.04 K/UL
NEUTROPHILS NFR BLD AUTO: 50 %
PLATELET # BLD AUTO: 303 K/UL
POTASSIUM SERPL-SCNC: 4.1 MMOL/L
PROT SERPL-MCNC: 7 G/DL
PT BLD: 11 SEC
RBC # BLD: 4.6 M/UL
RBC # FLD: 12.2 %
SODIUM SERPL-SCNC: 138 MMOL/L
WBC # FLD AUTO: 6.08 K/UL

## 2025-07-10 ENCOUNTER — APPOINTMENT (OUTPATIENT)
Dept: PULMONOLOGY | Facility: CLINIC | Age: 65
End: 2025-07-10
Payer: COMMERCIAL

## 2025-07-10 ENCOUNTER — NON-APPOINTMENT (OUTPATIENT)
Age: 65
End: 2025-07-10

## 2025-07-10 PROCEDURE — 93000 ELECTROCARDIOGRAM COMPLETE: CPT

## 2025-07-11 ENCOUNTER — APPOINTMENT (OUTPATIENT)
Dept: PULMONOLOGY | Facility: HOSPITAL | Age: 65
End: 2025-07-11

## 2025-07-11 ENCOUNTER — OUTPATIENT (OUTPATIENT)
Dept: OUTPATIENT SERVICES | Facility: HOSPITAL | Age: 65
LOS: 1 days | Discharge: HOME | End: 2025-07-11
Payer: COMMERCIAL

## 2025-07-11 PROCEDURE — 87077 CULTURE AEROBIC IDENTIFY: CPT

## 2025-07-11 PROCEDURE — 87205 SMEAR GRAM STAIN: CPT

## 2025-07-11 PROCEDURE — 31624 DX BRONCHOSCOPE/LAVAGE: CPT

## 2025-07-11 PROCEDURE — 87070 CULTURE OTHR SPECIMN AEROBIC: CPT

## 2025-07-11 PROCEDURE — 87102 FUNGUS ISOLATION CULTURE: CPT

## 2025-07-11 PROCEDURE — 87449 NOS EACH ORGANISM AG IA: CPT

## 2025-07-11 PROCEDURE — 89051 BODY FLUID CELL COUNT: CPT

## 2025-07-11 PROCEDURE — 87305 ASPERGILLUS AG IA: CPT

## 2025-07-11 PROCEDURE — 87564 MTB RIFAMPIN RST AMP PRB TQ: CPT

## 2025-07-11 PROCEDURE — 31624 DX BRONCHOSCOPE/LAVAGE: CPT | Mod: GC

## 2025-07-11 PROCEDURE — 87116 MYCOBACTERIA CULTURE: CPT

## 2025-07-11 PROCEDURE — 31645 BRNCHSC W/THER ASPIR 1ST: CPT | Mod: GC

## 2025-07-11 PROCEDURE — 36415 COLL VENOUS BLD VENIPUNCTURE: CPT

## 2025-07-21 ENCOUNTER — TRANSCRIPTION ENCOUNTER (OUTPATIENT)
Age: 65
End: 2025-07-21

## 2025-07-22 ENCOUNTER — TRANSCRIPTION ENCOUNTER (OUTPATIENT)
Age: 65
End: 2025-07-22

## 2025-07-22 ENCOUNTER — APPOINTMENT (OUTPATIENT)
Dept: PULMONOLOGY | Facility: CLINIC | Age: 65
End: 2025-07-22

## 2025-07-29 ENCOUNTER — TRANSCRIPTION ENCOUNTER (OUTPATIENT)
Age: 65
End: 2025-07-29

## 2025-07-30 ENCOUNTER — TRANSCRIPTION ENCOUNTER (OUTPATIENT)
Age: 65
End: 2025-07-30

## 2025-09-15 ENCOUNTER — APPOINTMENT (OUTPATIENT)
Dept: INFECTIOUS DISEASE | Facility: CLINIC | Age: 65
End: 2025-09-15
Payer: COMMERCIAL

## 2025-09-15 ENCOUNTER — TRANSCRIPTION ENCOUNTER (OUTPATIENT)
Age: 65
End: 2025-09-15

## 2025-09-15 VITALS
BODY MASS INDEX: 18.66 KG/M2 | SYSTOLIC BLOOD PRESSURE: 128 MMHG | HEART RATE: 86 BPM | TEMPERATURE: 97.6 F | OXYGEN SATURATION: 95 % | WEIGHT: 112 LBS | DIASTOLIC BLOOD PRESSURE: 71 MMHG | HEIGHT: 65 IN

## 2025-09-15 DIAGNOSIS — A31.0 PULMONARY MYCOBACTERIAL INFECTION: ICD-10-CM

## 2025-09-15 DIAGNOSIS — J47.9 BRONCHIECTASIS, UNCOMPLICATED: ICD-10-CM

## 2025-09-15 DIAGNOSIS — R05.9 COUGH, UNSPECIFIED: ICD-10-CM

## 2025-09-15 PROCEDURE — 99215 OFFICE O/P EST HI 40 MIN: CPT

## 2025-09-15 PROCEDURE — G2211 COMPLEX E/M VISIT ADD ON: CPT

## 2025-09-18 ENCOUNTER — TRANSCRIPTION ENCOUNTER (OUTPATIENT)
Age: 65
End: 2025-09-18

## 2025-09-18 LAB
ALBUMIN SERPL ELPH-MCNC: 4.4 G/DL
ALP BLD-CCNC: 58 U/L
ALT SERPL-CCNC: 18 U/L
ANION GAP SERPL CALC-SCNC: 15 MMOL/L
AST SERPL-CCNC: 23 U/L
BASOPHILS # BLD AUTO: 0.06 K/UL
BASOPHILS NFR BLD AUTO: 1.6 %
BILIRUB SERPL-MCNC: 0.3 MG/DL
BUN SERPL-MCNC: 9 MG/DL
CALCIUM SERPL-MCNC: 9.3 MG/DL
CHLORIDE SERPL-SCNC: 100 MMOL/L
CO2 SERPL-SCNC: 21 MMOL/L
CREAT SERPL-MCNC: 0.63 MG/DL
EGFRCR SERPLBLD CKD-EPI 2021: 100 ML/MIN/1.73M2
EOSINOPHIL # BLD AUTO: 0.2 K/UL
EOSINOPHIL NFR BLD AUTO: 5.4 %
GLUCOSE SERPL-MCNC: 82 MG/DL
HCT VFR BLD CALC: 41.6 %
HGB BLD-MCNC: 13.7 G/DL
IMM GRANULOCYTES NFR BLD AUTO: 0 %
LYMPHOCYTES # BLD AUTO: 1.27 K/UL
LYMPHOCYTES NFR BLD AUTO: 34.1 %
MAN DIFF?: NORMAL
MCHC RBC-ENTMCNC: 30.9 PG
MCHC RBC-ENTMCNC: 32.9 G/DL
MCV RBC AUTO: 93.9 FL
MONOCYTES # BLD AUTO: 0.59 K/UL
MONOCYTES NFR BLD AUTO: 15.9 %
NEUTROPHILS # BLD AUTO: 1.6 K/UL
NEUTROPHILS NFR BLD AUTO: 43 %
PLATELET # BLD AUTO: 275 K/UL
POTASSIUM SERPL-SCNC: 4.4 MMOL/L
PROT SERPL-MCNC: 6.8 G/DL
RBC # BLD: 4.43 M/UL
RBC # FLD: 13 %
SODIUM SERPL-SCNC: 135 MMOL/L
WBC # FLD AUTO: 3.72 K/UL

## 2025-09-19 ENCOUNTER — TRANSCRIPTION ENCOUNTER (OUTPATIENT)
Age: 65
End: 2025-09-19